# Patient Record
Sex: FEMALE | Race: WHITE | NOT HISPANIC OR LATINO | URBAN - METROPOLITAN AREA
[De-identification: names, ages, dates, MRNs, and addresses within clinical notes are randomized per-mention and may not be internally consistent; named-entity substitution may affect disease eponyms.]

---

## 2018-06-04 ENCOUNTER — ON CAMPUS - OUTPATIENT (OUTPATIENT)
Dept: URBAN - METROPOLITAN AREA HOSPITAL 2 | Facility: HOSPITAL | Age: 58
End: 2018-06-04

## 2018-06-04 ENCOUNTER — OFFICE (OUTPATIENT)
Dept: URBAN - METROPOLITAN AREA PATHOLOGY 4 | Facility: PATHOLOGY | Age: 58
End: 2018-06-04

## 2018-06-04 VITALS
HEART RATE: 100 BPM | DIASTOLIC BLOOD PRESSURE: 84 MMHG | DIASTOLIC BLOOD PRESSURE: 92 MMHG | DIASTOLIC BLOOD PRESSURE: 79 MMHG | TEMPERATURE: 97.8 F | SYSTOLIC BLOOD PRESSURE: 62 MMHG | OXYGEN SATURATION: 97 % | DIASTOLIC BLOOD PRESSURE: 105 MMHG | SYSTOLIC BLOOD PRESSURE: 135 MMHG | DIASTOLIC BLOOD PRESSURE: 110 MMHG | SYSTOLIC BLOOD PRESSURE: 134 MMHG | HEART RATE: 88 BPM | DIASTOLIC BLOOD PRESSURE: 72 MMHG | RESPIRATION RATE: 15 BRPM | OXYGEN SATURATION: 98 % | HEIGHT: 65 IN | DIASTOLIC BLOOD PRESSURE: 80 MMHG | SYSTOLIC BLOOD PRESSURE: 140 MMHG | HEART RATE: 90 BPM | SYSTOLIC BLOOD PRESSURE: 118 MMHG | DIASTOLIC BLOOD PRESSURE: 71 MMHG | SYSTOLIC BLOOD PRESSURE: 128 MMHG | HEART RATE: 92 BPM | RESPIRATION RATE: 17 BRPM | DIASTOLIC BLOOD PRESSURE: 67 MMHG | DIASTOLIC BLOOD PRESSURE: 73 MMHG | RESPIRATION RATE: 16 BRPM | HEART RATE: 89 BPM | HEART RATE: 102 BPM | DIASTOLIC BLOOD PRESSURE: 30 MMHG | WEIGHT: 205 LBS | SYSTOLIC BLOOD PRESSURE: 131 MMHG | SYSTOLIC BLOOD PRESSURE: 137 MMHG | SYSTOLIC BLOOD PRESSURE: 146 MMHG | DIASTOLIC BLOOD PRESSURE: 89 MMHG | OXYGEN SATURATION: 99 % | HEART RATE: 93 BPM | SYSTOLIC BLOOD PRESSURE: 148 MMHG | HEART RATE: 91 BPM

## 2018-06-04 DIAGNOSIS — K21.0 GASTRO-ESOPHAGEAL REFLUX DISEASE WITH ESOPHAGITIS: ICD-10-CM

## 2018-06-04 DIAGNOSIS — Z86.010 PERSONAL HISTORY OF COLONIC POLYPS: ICD-10-CM

## 2018-06-04 DIAGNOSIS — K44.9 DIAPHRAGMATIC HERNIA WITHOUT OBSTRUCTION OR GANGRENE: ICD-10-CM

## 2018-06-04 DIAGNOSIS — D12.3 BENIGN NEOPLASM OF TRANSVERSE COLON: ICD-10-CM

## 2018-06-04 DIAGNOSIS — K64.8 OTHER HEMORRHOIDS: ICD-10-CM

## 2018-06-04 DIAGNOSIS — D12.0 BENIGN NEOPLASM OF CECUM: ICD-10-CM

## 2018-06-04 DIAGNOSIS — K57.30 DIVERTICULOSIS OF LARGE INTESTINE WITHOUT PERFORATION OR ABS: ICD-10-CM

## 2018-06-04 DIAGNOSIS — K22.70 BARRETT'S ESOPHAGUS WITHOUT DYSPLASIA: ICD-10-CM

## 2018-06-04 DIAGNOSIS — K22.2 ESOPHAGEAL OBSTRUCTION: ICD-10-CM

## 2018-06-04 DIAGNOSIS — D12.5 BENIGN NEOPLASM OF SIGMOID COLON: ICD-10-CM

## 2018-06-04 DIAGNOSIS — K63.5 POLYP OF COLON: ICD-10-CM

## 2018-06-04 DIAGNOSIS — K63.3 ULCER OF INTESTINE: ICD-10-CM

## 2018-06-04 PROBLEM — K64.9 UNSPECIFIED HEMORRHOIDS: Status: ACTIVE | Noted: 2018-06-04

## 2018-06-04 LAB
GI HISTOLOGY: A. UNSPECIFIED: (no result)
GI HISTOLOGY: B. UNSPECIFIED: (no result)
GI HISTOLOGY: C. UNSPECIFIED: (no result)
GI HISTOLOGY: D. SELECT: (no result)
GI HISTOLOGY: E. UNSPECIFIED: (no result)
GI HISTOLOGY: PDF REPORT: (no result)

## 2018-06-04 PROCEDURE — 45380 COLONOSCOPY AND BIOPSY: CPT | Mod: 59 | Performed by: INTERNAL MEDICINE

## 2018-06-04 PROCEDURE — 43239 EGD BIOPSY SINGLE/MULTIPLE: CPT | Performed by: INTERNAL MEDICINE

## 2018-06-04 PROCEDURE — 88305 TISSUE EXAM BY PATHOLOGIST: CPT | Mod: 33 | Performed by: INTERNAL MEDICINE

## 2018-06-04 PROCEDURE — 45385 COLONOSCOPY W/LESION REMOVAL: CPT | Performed by: INTERNAL MEDICINE

## 2018-06-04 PROCEDURE — 43450 DILATE ESOPHAGUS 1/MULT PASS: CPT | Performed by: INTERNAL MEDICINE

## 2018-06-04 RX ORDER — OMEPRAZOLE 20 MG/1
CAPSULE, DELAYED RELEASE ORAL
Qty: 30 | Refills: 0 | Status: ACTIVE

## 2018-06-04 RX ADMIN — Medication 10 MG: at 13:05

## 2018-06-04 RX ADMIN — PROPOFOL: 10 INJECTION, EMULSION INTRAVENOUS at 12:50

## 2024-01-30 ENCOUNTER — OFFICE VISIT (OUTPATIENT)
Dept: PSYCHIATRY | Facility: CLINIC | Age: 64
End: 2024-01-30
Payer: COMMERCIAL

## 2024-01-30 DIAGNOSIS — F41.1 GENERALIZED ANXIETY DISORDER: ICD-10-CM

## 2024-01-30 DIAGNOSIS — F33.1 MAJOR DEPRESSIVE DISORDER, RECURRENT EPISODE, MODERATE: ICD-10-CM

## 2024-01-30 RX ORDER — HYDROXYZINE HYDROCHLORIDE 25 MG/1
25 TABLET, FILM COATED ORAL 2 TIMES DAILY PRN
Qty: 60 TABLET | Refills: 2 | Status: SHIPPED | OUTPATIENT
Start: 2024-01-30

## 2024-01-30 RX ORDER — SERTRALINE HYDROCHLORIDE 100 MG/1
100 TABLET, FILM COATED ORAL NIGHTLY
Qty: 30 TABLET | Refills: 2 | Status: SHIPPED | OUTPATIENT
Start: 2024-01-30 | End: 2025-01-29
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
## 2024-03-26 ENCOUNTER — OFFICE VISIT (OUTPATIENT)
Dept: PSYCHIATRY | Facility: CLINIC | Age: 64
End: 2024-03-26
Payer: COMMERCIAL

## 2024-03-26 DIAGNOSIS — F41.1 GENERALIZED ANXIETY DISORDER: ICD-10-CM

## 2024-03-26 DIAGNOSIS — F33.1 MAJOR DEPRESSIVE DISORDER, RECURRENT EPISODE, MODERATE: Primary | ICD-10-CM

## 2024-03-26 RX ORDER — SERTRALINE HYDROCHLORIDE 100 MG/1
100 TABLET, FILM COATED ORAL NIGHTLY
Qty: 90 TABLET | Refills: 1 | Status: SHIPPED | OUTPATIENT
Start: 2024-03-26 | End: 2025-03-26

## 2024-03-26 RX ORDER — HYDROXYZINE HYDROCHLORIDE 25 MG/1
25 TABLET, FILM COATED ORAL 2 TIMES DAILY PRN
Qty: 180 TABLET | Refills: 0 | Status: SHIPPED | OUTPATIENT
Start: 2024-03-26
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
## 2024-03-30 ENCOUNTER — HOSPITAL ENCOUNTER (OUTPATIENT)
Facility: HOSPITAL | Age: 64
Setting detail: OBSERVATION
Discharge: HOME | End: 2024-03-31
Attending: EMERGENCY MEDICINE | Admitting: FAMILY MEDICINE
Payer: COMMERCIAL

## 2024-03-30 ENCOUNTER — APPOINTMENT (OUTPATIENT)
Dept: MRI IMAGING | Facility: HOSPITAL | Age: 64
End: 2024-03-30
Payer: COMMERCIAL

## 2024-03-30 ENCOUNTER — APPOINTMENT (OUTPATIENT)
Dept: GENERAL RADIOLOGY | Facility: HOSPITAL | Age: 64
End: 2024-03-30
Payer: COMMERCIAL

## 2024-03-30 ENCOUNTER — APPOINTMENT (OUTPATIENT)
Dept: CT IMAGING | Facility: HOSPITAL | Age: 64
End: 2024-03-30
Payer: COMMERCIAL

## 2024-03-30 DIAGNOSIS — S05.02XA LEFT CORNEAL ABRASION, INITIAL ENCOUNTER: ICD-10-CM

## 2024-03-30 DIAGNOSIS — H53.462 HEMIANOPIA OF LEFT EYE: Primary | ICD-10-CM

## 2024-03-30 PROBLEM — H53.47 HEMIANOPSIA: Status: ACTIVE | Noted: 2024-03-30

## 2024-03-30 LAB
ALBUMIN SERPL-MCNC: 4.5 G/DL (ref 3.5–5.2)
ALBUMIN/GLOB SERPL: 1.9 G/DL
ALP SERPL-CCNC: 101 U/L (ref 39–117)
ALT SERPL W P-5'-P-CCNC: 27 U/L (ref 1–33)
ANION GAP SERPL CALCULATED.3IONS-SCNC: 13.3 MMOL/L (ref 5–15)
ANION GAP SERPL CALCULATED.3IONS-SCNC: 14 MMOL/L (ref 5–15)
APTT PPP: 27.7 SECONDS (ref 24–31)
AST SERPL-CCNC: 17 U/L (ref 1–32)
BASOPHILS # BLD AUTO: 0.02 10*3/MM3 (ref 0–0.2)
BASOPHILS # BLD AUTO: 0.04 10*3/MM3 (ref 0–0.2)
BASOPHILS NFR BLD AUTO: 0.3 % (ref 0–1.5)
BASOPHILS NFR BLD AUTO: 0.6 % (ref 0–1.5)
BILIRUB SERPL-MCNC: 0.6 MG/DL (ref 0–1.2)
BUN SERPL-MCNC: 11 MG/DL (ref 8–23)
BUN SERPL-MCNC: 12 MG/DL (ref 8–23)
BUN/CREAT SERPL: 14.3 (ref 7–25)
BUN/CREAT SERPL: 14.3 (ref 7–25)
CALCIUM SPEC-SCNC: 9.7 MG/DL (ref 8.6–10.5)
CALCIUM SPEC-SCNC: 9.8 MG/DL (ref 8.6–10.5)
CHLORIDE SERPL-SCNC: 95 MMOL/L (ref 98–107)
CHLORIDE SERPL-SCNC: 97 MMOL/L (ref 98–107)
CHOLEST SERPL-MCNC: 158 MG/DL (ref 0–200)
CO2 SERPL-SCNC: 23.7 MMOL/L (ref 22–29)
CO2 SERPL-SCNC: 24 MMOL/L (ref 22–29)
CREAT SERPL-MCNC: 0.77 MG/DL (ref 0.57–1)
CREAT SERPL-MCNC: 0.84 MG/DL (ref 0.57–1)
DEPRECATED RDW RBC AUTO: 38.5 FL (ref 37–54)
DEPRECATED RDW RBC AUTO: 40.2 FL (ref 37–54)
EGFRCR SERPLBLD CKD-EPI 2021: 78.2 ML/MIN/1.73
EGFRCR SERPLBLD CKD-EPI 2021: 86.8 ML/MIN/1.73
EOSINOPHIL # BLD AUTO: 0.13 10*3/MM3 (ref 0–0.4)
EOSINOPHIL # BLD AUTO: 0.13 10*3/MM3 (ref 0–0.4)
EOSINOPHIL NFR BLD AUTO: 1.7 % (ref 0.3–6.2)
EOSINOPHIL NFR BLD AUTO: 1.9 % (ref 0.3–6.2)
ERYTHROCYTE [DISTWIDTH] IN BLOOD BY AUTOMATED COUNT: 12.1 % (ref 12.3–15.4)
ERYTHROCYTE [DISTWIDTH] IN BLOOD BY AUTOMATED COUNT: 12.3 % (ref 12.3–15.4)
GLOBULIN UR ELPH-MCNC: 2.4 GM/DL
GLUCOSE BLDC GLUCOMTR-MCNC: 249 MG/DL (ref 70–105)
GLUCOSE BLDC GLUCOMTR-MCNC: 267 MG/DL (ref 70–130)
GLUCOSE SERPL-MCNC: 175 MG/DL (ref 65–99)
GLUCOSE SERPL-MCNC: 262 MG/DL (ref 65–99)
HBA1C MFR BLD: 10.1 % (ref 4.8–5.6)
HCT VFR BLD AUTO: 47.3 % (ref 34–46.6)
HCT VFR BLD AUTO: 49.1 % (ref 34–46.6)
HDLC SERPL-MCNC: 41 MG/DL (ref 40–60)
HGB BLD-MCNC: 15.5 G/DL (ref 12–15.9)
HGB BLD-MCNC: 15.8 G/DL (ref 12–15.9)
HOLD SPECIMEN: NORMAL
IMM GRANULOCYTES # BLD AUTO: 0.04 10*3/MM3 (ref 0–0.05)
IMM GRANULOCYTES # BLD AUTO: 0.04 10*3/MM3 (ref 0–0.05)
IMM GRANULOCYTES NFR BLD AUTO: 0.5 % (ref 0–0.5)
IMM GRANULOCYTES NFR BLD AUTO: 0.6 % (ref 0–0.5)
INR PPP: 1.3 (ref 0.8–1.2)
LDLC SERPL CALC-MCNC: 74 MG/DL (ref 0–100)
LDLC/HDLC SERPL: 1.55 {RATIO}
LYMPHOCYTES # BLD AUTO: 1.41 10*3/MM3 (ref 0.7–3.1)
LYMPHOCYTES # BLD AUTO: 2.42 10*3/MM3 (ref 0.7–3.1)
LYMPHOCYTES NFR BLD AUTO: 21 % (ref 19.6–45.3)
LYMPHOCYTES NFR BLD AUTO: 31.5 % (ref 19.6–45.3)
MAGNESIUM SERPL-MCNC: 1.8 MG/DL (ref 1.6–2.4)
MCH RBC QN AUTO: 28.1 PG (ref 26.6–33)
MCH RBC QN AUTO: 28.2 PG (ref 26.6–33)
MCHC RBC AUTO-ENTMCNC: 32.2 G/DL (ref 31.5–35.7)
MCHC RBC AUTO-ENTMCNC: 32.8 G/DL (ref 31.5–35.7)
MCV RBC AUTO: 86.2 FL (ref 79–97)
MCV RBC AUTO: 87.4 FL (ref 79–97)
MONOCYTES # BLD AUTO: 0.45 10*3/MM3 (ref 0.1–0.9)
MONOCYTES # BLD AUTO: 0.51 10*3/MM3 (ref 0.1–0.9)
MONOCYTES NFR BLD AUTO: 6.6 % (ref 5–12)
MONOCYTES NFR BLD AUTO: 6.7 % (ref 5–12)
NEUTROPHILS NFR BLD AUTO: 4.56 10*3/MM3 (ref 1.7–7)
NEUTROPHILS NFR BLD AUTO: 4.63 10*3/MM3 (ref 1.7–7)
NEUTROPHILS NFR BLD AUTO: 59.4 % (ref 42.7–76)
NEUTROPHILS NFR BLD AUTO: 69.2 % (ref 42.7–76)
NRBC BLD AUTO-RTO: 0 /100 WBC (ref 0–0.2)
PLATELET # BLD AUTO: 249 10*3/MM3 (ref 140–450)
PLATELET # BLD AUTO: 280 10*3/MM3 (ref 140–450)
PMV BLD AUTO: 9.4 FL (ref 6–12)
PMV BLD AUTO: 9.9 FL (ref 6–12)
POTASSIUM SERPL-SCNC: 4.1 MMOL/L (ref 3.5–5.2)
POTASSIUM SERPL-SCNC: 4.4 MMOL/L (ref 3.5–5.2)
PROT SERPL-MCNC: 6.9 G/DL (ref 6–8.5)
PROTHROMBIN TIME: 14.2 SECONDS
QT INTERVAL: 364 MS
QTC INTERVAL: 470 MS
RBC # BLD AUTO: 5.49 10*6/MM3 (ref 3.77–5.28)
RBC # BLD AUTO: 5.62 10*6/MM3 (ref 3.77–5.28)
SODIUM SERPL-SCNC: 132 MMOL/L (ref 136–145)
SODIUM SERPL-SCNC: 135 MMOL/L (ref 136–145)
TRIGL SERPL-MCNC: 267 MG/DL (ref 0–150)
TROPONIN T SERPL HS-MCNC: 8 NG/L
TSH SERPL DL<=0.05 MIU/L-ACNC: 1.02 UIU/ML (ref 0.27–4.2)
VLDLC SERPL-MCNC: 43 MG/DL (ref 5–40)
WBC NRBC COR # BLD AUTO: 6.7 10*3/MM3 (ref 3.4–10.8)
WBC NRBC COR # BLD AUTO: 7.68 10*3/MM3 (ref 3.4–10.8)
WHOLE BLOOD HOLD COAG: NORMAL
WHOLE BLOOD HOLD SPECIMEN: NORMAL

## 2024-03-30 PROCEDURE — 82948 REAGENT STRIP/BLOOD GLUCOSE: CPT

## 2024-03-30 PROCEDURE — 85730 THROMBOPLASTIN TIME PARTIAL: CPT | Performed by: EMERGENCY MEDICINE

## 2024-03-30 PROCEDURE — 70498 CT ANGIOGRAPHY NECK: CPT

## 2024-03-30 PROCEDURE — 70553 MRI BRAIN STEM W/O & W/DYE: CPT

## 2024-03-30 PROCEDURE — 99291 CRITICAL CARE FIRST HOUR: CPT

## 2024-03-30 PROCEDURE — 84443 ASSAY THYROID STIM HORMONE: CPT | Performed by: PSYCHIATRY & NEUROLOGY

## 2024-03-30 PROCEDURE — 70450 CT HEAD/BRAIN W/O DYE: CPT

## 2024-03-30 PROCEDURE — 0042T HC CT CEREBRAL PERFUSION W/WO CONTRAST: CPT

## 2024-03-30 PROCEDURE — 85025 COMPLETE CBC W/AUTO DIFF WBC: CPT

## 2024-03-30 PROCEDURE — 80061 LIPID PANEL: CPT | Performed by: PSYCHIATRY & NEUROLOGY

## 2024-03-30 PROCEDURE — 85610 PROTHROMBIN TIME: CPT | Performed by: EMERGENCY MEDICINE

## 2024-03-30 PROCEDURE — 99291 CRITICAL CARE FIRST HOUR: CPT | Performed by: EMERGENCY MEDICINE

## 2024-03-30 PROCEDURE — 99204 OFFICE O/P NEW MOD 45 MIN: CPT | Performed by: STUDENT IN AN ORGANIZED HEALTH CARE EDUCATION/TRAINING PROGRAM

## 2024-03-30 PROCEDURE — 84484 ASSAY OF TROPONIN QUANT: CPT | Performed by: EMERGENCY MEDICINE

## 2024-03-30 PROCEDURE — 25510000001 IOPAMIDOL PER 1 ML: Performed by: EMERGENCY MEDICINE

## 2024-03-30 PROCEDURE — 93005 ELECTROCARDIOGRAM TRACING: CPT | Performed by: EMERGENCY MEDICINE

## 2024-03-30 PROCEDURE — 63710000001 INSULIN LISPRO (HUMAN) PER 5 UNITS: Performed by: FAMILY MEDICINE

## 2024-03-30 PROCEDURE — 83036 HEMOGLOBIN GLYCOSYLATED A1C: CPT | Performed by: PSYCHIATRY & NEUROLOGY

## 2024-03-30 PROCEDURE — 80053 COMPREHEN METABOLIC PANEL: CPT | Performed by: EMERGENCY MEDICINE

## 2024-03-30 PROCEDURE — 93010 ELECTROCARDIOGRAM REPORT: CPT | Performed by: EMERGENCY MEDICINE

## 2024-03-30 PROCEDURE — 82607 VITAMIN B-12: CPT | Performed by: PSYCHIATRY & NEUROLOGY

## 2024-03-30 PROCEDURE — G0378 HOSPITAL OBSERVATION PER HR: HCPCS

## 2024-03-30 PROCEDURE — 71045 X-RAY EXAM CHEST 1 VIEW: CPT

## 2024-03-30 PROCEDURE — 25010000002 GADOTERIDOL PER 1 ML: Performed by: FAMILY MEDICINE

## 2024-03-30 PROCEDURE — A9579 GAD-BASE MR CONTRAST NOS,1ML: HCPCS | Performed by: FAMILY MEDICINE

## 2024-03-30 PROCEDURE — 83735 ASSAY OF MAGNESIUM: CPT | Performed by: FAMILY MEDICINE

## 2024-03-30 PROCEDURE — 85025 COMPLETE CBC W/AUTO DIFF WBC: CPT | Performed by: FAMILY MEDICINE

## 2024-03-30 PROCEDURE — 70496 CT ANGIOGRAPHY HEAD: CPT

## 2024-03-30 RX ORDER — DEXTROSE MONOHYDRATE 25 G/50ML
25 INJECTION, SOLUTION INTRAVENOUS
Status: DISCONTINUED | OUTPATIENT
Start: 2024-03-30 | End: 2024-03-31 | Stop reason: HOSPADM

## 2024-03-30 RX ORDER — METOPROLOL TARTRATE 1 MG/ML
5 INJECTION, SOLUTION INTRAVENOUS EVERY 4 HOURS PRN
Status: DISCONTINUED | OUTPATIENT
Start: 2024-03-30 | End: 2024-03-31 | Stop reason: HOSPADM

## 2024-03-30 RX ORDER — SODIUM CHLORIDE 0.9 % (FLUSH) 0.9 %
10 SYRINGE (ML) INJECTION AS NEEDED
Status: DISCONTINUED | OUTPATIENT
Start: 2024-03-30 | End: 2024-03-31 | Stop reason: HOSPADM

## 2024-03-30 RX ORDER — POLYETHYLENE GLYCOL 3350 17 G/17G
17 POWDER, FOR SOLUTION ORAL DAILY PRN
Status: DISCONTINUED | OUTPATIENT
Start: 2024-03-30 | End: 2024-03-31 | Stop reason: HOSPADM

## 2024-03-30 RX ORDER — INSULIN LISPRO 100 [IU]/ML
2-7 INJECTION, SOLUTION INTRAVENOUS; SUBCUTANEOUS
Status: DISCONTINUED | OUTPATIENT
Start: 2024-03-30 | End: 2024-03-31 | Stop reason: HOSPADM

## 2024-03-30 RX ORDER — SODIUM CHLORIDE 9 MG/ML
40 INJECTION, SOLUTION INTRAVENOUS AS NEEDED
Status: DISCONTINUED | OUTPATIENT
Start: 2024-03-30 | End: 2024-03-31 | Stop reason: HOSPADM

## 2024-03-30 RX ORDER — NICOTINE POLACRILEX 4 MG
15 LOZENGE BUCCAL
Status: DISCONTINUED | OUTPATIENT
Start: 2024-03-30 | End: 2024-03-31 | Stop reason: HOSPADM

## 2024-03-30 RX ORDER — BISACODYL 10 MG
10 SUPPOSITORY, RECTAL RECTAL DAILY PRN
Status: DISCONTINUED | OUTPATIENT
Start: 2024-03-30 | End: 2024-03-31 | Stop reason: HOSPADM

## 2024-03-30 RX ORDER — ONDANSETRON 2 MG/ML
4 INJECTION INTRAMUSCULAR; INTRAVENOUS EVERY 6 HOURS PRN
Status: DISCONTINUED | OUTPATIENT
Start: 2024-03-30 | End: 2024-03-31 | Stop reason: HOSPADM

## 2024-03-30 RX ORDER — BISACODYL 5 MG/1
5 TABLET, DELAYED RELEASE ORAL DAILY PRN
Status: DISCONTINUED | OUTPATIENT
Start: 2024-03-30 | End: 2024-03-31 | Stop reason: HOSPADM

## 2024-03-30 RX ORDER — NITROGLYCERIN 0.4 MG/1
0.4 TABLET SUBLINGUAL
Status: DISCONTINUED | OUTPATIENT
Start: 2024-03-30 | End: 2024-03-31 | Stop reason: HOSPADM

## 2024-03-30 RX ORDER — ERYTHROMYCIN 5 MG/G
1 OINTMENT OPHTHALMIC EVERY 6 HOURS SCHEDULED
Status: DISCONTINUED | OUTPATIENT
Start: 2024-03-30 | End: 2024-03-31 | Stop reason: HOSPADM

## 2024-03-30 RX ORDER — ONDANSETRON 4 MG/1
4 TABLET, ORALLY DISINTEGRATING ORAL EVERY 6 HOURS PRN
Status: DISCONTINUED | OUTPATIENT
Start: 2024-03-30 | End: 2024-03-31 | Stop reason: HOSPADM

## 2024-03-30 RX ORDER — IBUPROFEN 600 MG/1
1 TABLET ORAL
Status: DISCONTINUED | OUTPATIENT
Start: 2024-03-30 | End: 2024-03-31 | Stop reason: HOSPADM

## 2024-03-30 RX ORDER — SODIUM CHLORIDE 0.9 % (FLUSH) 0.9 %
10 SYRINGE (ML) INJECTION EVERY 12 HOURS SCHEDULED
Status: DISCONTINUED | OUTPATIENT
Start: 2024-03-30 | End: 2024-03-31 | Stop reason: HOSPADM

## 2024-03-30 RX ORDER — AMOXICILLIN 250 MG
2 CAPSULE ORAL 2 TIMES DAILY PRN
Status: DISCONTINUED | OUTPATIENT
Start: 2024-03-30 | End: 2024-03-31 | Stop reason: HOSPADM

## 2024-03-30 RX ADMIN — INSULIN LISPRO 3 UNITS: 100 INJECTION, SOLUTION INTRAVENOUS; SUBCUTANEOUS at 21:27

## 2024-03-30 RX ADMIN — FLUORESCEIN SODIUM 1 STRIP: 1 STRIP OPHTHALMIC at 11:40

## 2024-03-30 RX ADMIN — IOPAMIDOL 50 ML: 755 INJECTION, SOLUTION INTRAVENOUS at 12:03

## 2024-03-30 RX ADMIN — IOPAMIDOL 100 ML: 755 INJECTION, SOLUTION INTRAVENOUS at 12:06

## 2024-03-30 RX ADMIN — GADOTERIDOL 18 ML: 279.3 INJECTION, SOLUTION INTRAVENOUS at 17:40

## 2024-03-30 RX ADMIN — Medication 10 ML: at 21:28

## 2024-03-30 NOTE — PLAN OF CARE
Problem: Adult Inpatient Plan of Care  Goal: Plan of Care Review  Outcome: Ongoing, Progressing  Goal: Patient-Specific Goal (Individualized)  Outcome: Ongoing, Progressing  Goal: Absence of Hospital-Acquired Illness or Injury  Outcome: Ongoing, Progressing  Goal: Optimal Comfort and Wellbeing  Outcome: Ongoing, Progressing  Goal: Readiness for Transition of Care  Outcome: Ongoing, Progressing  Intervention: Mutually Develop Transition Plan  Recent Flowsheet Documentation  Taken 3/30/2024 2619 by Parvin Bangura RN  Equipment Currently Used at Home: none  Transportation Anticipated: family or friend will provide  Patient/Family Anticipated Services at Transition: none  Patient/Family Anticipates Transition to: home with family   Goal Outcome Evaluation:         Patient arrived to our unit from the [facility] and a complete head to toe assessment was completed as well as an admission assessment. A two nurse skin check was performed and there were not any pressure injuries noted. The patient is awaiting a MRI. The patient did not have any complaints and remains on room air. Will continue to monitor.

## 2024-03-30 NOTE — PLAN OF CARE
Problem: Adult Inpatient Plan of Care  Goal: Plan of Care Review  Outcome: Ongoing, Progressing  Goal: Patient-Specific Goal (Individualized)  Outcome: Ongoing, Progressing  Goal: Absence of Hospital-Acquired Illness or Injury  Outcome: Ongoing, Progressing  Goal: Optimal Comfort and Wellbeing  Outcome: Ongoing, Progressing  Goal: Readiness for Transition of Care  Outcome: Ongoing, Progressing     Problem: Diabetes Comorbidity  Goal: Blood Glucose Level Within Targeted Range  Outcome: Ongoing, Progressing     Problem: Hypertension Comorbidity  Goal: Blood Pressure in Desired Range  Outcome: Ongoing, Progressing     Problem: Adjustment to Illness (Stroke, Ischemic/Transient Ischemic Attack)  Goal: Optimal Coping  Outcome: Ongoing, Progressing     Problem: Bowel Elimination Impaired (Stroke, Ischemic/Transient Ischemic Attack)  Goal: Effective Bowel Elimination  Outcome: Ongoing, Progressing     Problem: Cerebral Tissue Perfusion (Stroke, Ischemic/Transient Ischemic Attack)  Goal: Optimal Cerebral Tissue Perfusion  Outcome: Ongoing, Progressing     Problem: Cognitive Impairment (Stroke, Ischemic/Transient Ischemic Attack)  Goal: Optimal Cognitive Function  Outcome: Ongoing, Progressing     Problem: Communication Impairment (Stroke, Ischemic/Transient Ischemic Attack)  Goal: Improved Communication Skills  Outcome: Ongoing, Progressing     Problem: Functional Ability Impaired (Stroke, Ischemic/Transient Ischemic Attack)  Goal: Optimal Functional Ability  Outcome: Ongoing, Progressing     Problem: Respiratory Compromise (Stroke, Ischemic/Transient Ischemic Attack)  Goal: Effective Oxygenation and Ventilation  Outcome: Ongoing, Progressing     Problem: Sensorimotor Impairment (Stroke, Ischemic/Transient Ischemic Attack)  Goal: Improved Sensorimotor Function  Outcome: Ongoing, Progressing     Problem: Swallowing Impairment (Stroke, Ischemic/Transient Ischemic Attack)  Goal: Optimal Eating and Swallowing without  Aspiration  Outcome: Ongoing, Progressing     Problem: Urinary Elimination Impaired (Stroke, Ischemic/Transient Ischemic Attack)  Goal: Effective Urinary Elimination  Outcome: Ongoing, Progressing   Goal Outcome Evaluation:

## 2024-03-30 NOTE — H&P
[facility] Medicine Services  History & Physical    Patient Name: Rita Hussein  : 1960  MRN: [id number]  Primary Care Physician:  Monserrat Reddy APRN  Date of admission: 3/30/2024  Date and Time of Service: 3/30/2024 at 1639    Subjective      Chief Complaint: vision change    History of Present Illness: Rita Hussein is a 63 y.o. female with a CMH of hypertension, hyperlipidemia, diabetes, CVA presenting to the hospital with vision changes on her left side.  Patient mostly describes it as a blurry vision and was also having pain in the left eye as well.  Patient did not have any focal weakness or any numbness tingling or any facial drooping.    Review of Systems   Respiratory:  Negative for shortness of breath.        Personal History     Past Medical History:   Diagnosis Date    Diabetes        History reviewed. No pertinent surgical history.    Family History: family history is not on file. Otherwise pertinent FHx was reviewed and not pertinent to current issue.    Social History:  reports that she has been smoking cigarettes. She has never used smokeless tobacco. She reports that she does not drink alcohol and does not use drugs.    Home Medications:  Prior to Admission Medications       Prescriptions Last Dose Informant Patient Reported? Taking?    Accu-Chek Softclix Lancets lancets   Yes No    See Admin Instructions.    atorvastatin (LIPITOR) 40 MG tablet   Yes No    TAKE ONE (1) TABLET BY MOUTH EVERY EVENING    clopidogrel (PLAVIX) 75 MG tablet   Yes No    TAKE ONE (1) TABLET BY MOUTH EVERY DAY    hydrOXYzine (ATARAX) 25 MG tablet   No No    Take 1 tablet by mouth 2 (Two) Times a Day As Needed for Anxiety.    Jardiance 25 MG tablet tablet   Yes No    TAKE ONE (1) TABLET BY MOUTH EVERY DAY    lisinopril (PRINIVIL,ZESTRIL) 10 MG tablet   Yes No    TAKE ONE (1) TABLET BY MOUTH EVERY DAY    loratadine (CLARITIN) 10 MG tablet   Yes No    Take 1 tablet by mouth Daily.    metFORMIN ER  (GLUCOPHAGE-XR) 500 MG 24 hr tablet   Yes No    TAKE TWO (2) TABLETS BY MOUTH EVERY DAY WITH breakfast    sertraline (Zoloft) 100 MG tablet   No No    Take 1 tablet by mouth Every Night.              Allergies:  No Known Allergies    Objective      Vitals:   Temp:  [98.5 °F (36.9 °C)] 98.5 °F (36.9 °C)  Heart Rate:  [] 99  Resp:  [16-19] 19  BP: (129-154)/(66-89) 134/66  Body mass index is 33.78 kg/m².  Physical Exam  Vitals and nursing note reviewed.   Constitutional:       General: She is not in acute distress.     Appearance: She is well-developed. She is not diaphoretic.   HENT:      Head: Normocephalic and atraumatic.   Cardiovascular:      Rate and Rhythm: Normal rate.   Pulmonary:      Effort: Pulmonary effort is normal. No respiratory distress.      Breath sounds: No wheezing.   Abdominal:      General: There is no distension.      Palpations: Abdomen is soft.   Musculoskeletal:         General: Normal range of motion.   Skin:     General: Skin is warm and dry.   Neurological:      General: No focal deficit present.      Mental Status: She is alert.      Cranial Nerves: No cranial nerve deficit.   Psychiatric:         Behavior: Behavior normal.         Thought Content: Thought content normal.         Judgment: Judgment normal.         Diagnostic Data:  Lab Results (last 24 hours)       Procedure Component Value Units Date/Time    [place] Draw [[id number]] Collected: 03/30/24 1047    Specimen: Blood Updated: 03/30/24 1216    Narrative:      The following orders were created for panel order [place] Draw.  Procedure                               Abnormality         Status                     ---------                               -----------         ------                     Green Top (Gel)[[id number]]                                                             Lavender Top[[id number]]                                     Final result               Gold Top - SST[[id number]]                                                               Light Blue Top[[id number]]                                   Final result               Green Top (Gel)[[id number]]                                  Final result                 Please view results for these tests on the individual orders.    Light Blue Top [[id number]] Collected: 03/30/24 1114    Specimen: Blood Updated: 03/30/24 1216     Extra Tube Hold for add-ons.     Comment: Auto resulted       Single High Sensitivity Troponin T [[id number]]  (Normal) Collected: 03/30/24 1047    Specimen: Blood Updated: 03/30/24 1201     HS Troponin T 8 ng/L     Narrative:      High Sensitive Troponin T Reference Range:  <14.0 ng/L- Negative Female for AMI  <22.0 ng/L- Negative Male for AMI  >=14 - Abnormal Female indicating possible myocardial injury.  >=22 - Abnormal Male indicating possible myocardial injury.   Clinicians would have to utilize clinical acumen, EKG, Troponin, and serial changes to determine if it is an Acute Myocardial Infarction or myocardial injury due to an underlying chronic condition.         Lavender Top [[id number]] Collected: 03/30/24 1047    Specimen: Blood Updated: 03/30/24 1200     Extra Tube hold for add-on     Comment: Auto resulted       Green Top (Gel) [[id number]] Collected: 03/30/24 1047    Specimen: Blood Updated: 03/30/24 1200     Extra Tube Hold for add-ons.     Comment: Auto resulted.       POC Protime / INR [[id number]]  (Abnormal) Collected: 03/30/24 1152    Specimen: Blood Updated: 03/30/24 1159     Protime 14.2 seconds      INR 1.3    aPTT [[id number]] Collected: 03/30/24 1114    Specimen: Blood Updated: 03/30/24 1153    Comprehensive Metabolic Panel [[id number]]  (Abnormal) Collected: 03/30/24 1047    Specimen: Blood Updated: 03/30/24 1121     Glucose 262 mg/dL      BUN 12 mg/dL      Creatinine 0.84 mg/dL      Sodium 132 mmol/L      Potassium 4.1 mmol/L      Chloride 95 mmol/L      CO2 23.7 mmol/L      Calcium 9.7 mg/dL      Total Protein 6.9 g/dL      Albumin 4.5  g/dL      ALT (SGPT) 27 U/L      AST (SGOT) 17 U/L      Alkaline Phosphatase 101 U/L      Total Bilirubin 0.6 mg/dL      Globulin 2.4 gm/dL      A/G Ratio 1.9 g/dL      BUN/Creatinine Ratio 14.3     Anion Gap 13.3 mmol/L      eGFR 78.2 mL/min/1.73     Narrative:      GFR Normal >60  Chronic Kidney Disease <60  Kidney Failure <15      CBC & Differential [[id number]]  (Abnormal) Collected: 03/30/24 1047    Specimen: Blood Updated: 03/30/24 1054    Narrative:      The following orders were created for panel order CBC & Differential.  Procedure                               Abnormality         Status                     ---------                               -----------         ------                     CBC Auto Differential[[id number]]        Abnormal            Final result                 Please view results for these tests on the individual orders.    CBC Auto Differential [[id number]]  (Abnormal) Collected: 03/30/24 1047    Specimen: Blood Updated: 03/30/24 1054     WBC 7.68 10*3/mm3      RBC 5.62 10*6/mm3      Hemoglobin 15.8 g/dL      Hematocrit 49.1 %      MCV 87.4 fL      MCH 28.1 pg      MCHC 32.2 g/dL      RDW 12.3 %      RDW-SD 40.2 fl      MPV 9.4 fL      Platelets 249 10*3/mm3      Neutrophil % 59.4 %      Lymphocyte % 31.5 %      Monocyte % 6.6 %      Eosinophil % 1.7 %      Basophil % 0.3 %      Immature Grans % 0.5 %      Neutrophils, Absolute 4.56 10*3/mm3      Lymphocytes, Absolute 2.42 10*3/mm3      Monocytes, Absolute 0.51 10*3/mm3      Eosinophils, Absolute 0.13 10*3/mm3      Basophils, Absolute 0.02 10*3/mm3      Immature Grans, Absolute 0.04 10*3/mm3     POC Glucose Once [[id number]]  (Abnormal) Collected: 03/30/24 1049    Specimen: Blood Updated: 03/30/24 1051     Glucose 267 mg/dL      Comment: Serial Number: [id number]:  [id number]                Imaging Results (Last 24 Hours)       Procedure Component Value Units Date/Time    CT Angiogram Head w AI Analysis of LVO [[id number]] Collected:  03/30/24 1211     Updated: 03/30/24 1222    Narrative:      CT ANGIOGRAM HEAD W AI ANALYSIS OF LVO, CT ANGIOGRAM NECK    Date of Exam: 3/30/2024 11:55 AM EDT    Indication: Neuro deficit, acute stroke suspected.    Comparison: None available.    Technique: CTA of the head was performed after the uneventful intravenous administration of iodinated contrast. Reconstructed coronal and sagittal images were also obtained. In addition, a 3-D volume rendered image was created for interpretation.   Automated exposure control and iterative reconstruction methods were used.    FINDINGS:    Vascular Findings:    The right common carotid, internal carotid, middle cerebral, anterior cerebral, vertebral, and posterior cerebral arteries are patent without abrupt cut off or aneurysmal dilation.    The left common carotid, internal carotid, middle cerebral, anterior cerebral, vertebral, and posterior cerebral arteries are patent without abrupt cut off or aneurysmal dilation.    Limited visualization of the bilateral vertebral artery origins due to motion artifact.    Basilar artery appears patent and appears unremarkable.    Non-vascular Findings:    For description of nonvascular intracranial findings, please refer to the noncontrast head CT performed the same date.    No acute abnormality is identified within the visualized soft tissue or bony structures of the neck.    The visualized lung apices are clear.      Impression:      1.No acute abnormality identified within the large arteries of the head or neck.  2.No significant stenosis of the bilateral internal carotid arteries.      Electronically Signed: Jason Renteria MD    3/30/2024 12:20 PM EDT    Workstation ID: [id number]    CT Angiogram Neck [[id number]] Collected: 03/30/24 1211     Updated: 03/30/24 1222    Narrative:      CT ANGIOGRAM HEAD W AI ANALYSIS OF LVO, CT ANGIOGRAM NECK    Date of Exam: 3/30/2024 11:55 AM EDT    Indication: Neuro deficit, acute stroke  suspected.    Comparison: None available.    Technique: CTA of the head was performed after the uneventful intravenous administration of iodinated contrast. Reconstructed coronal and sagittal images were also obtained. In addition, a 3-D volume rendered image was created for interpretation.   Automated exposure control and iterative reconstruction methods were used.    FINDINGS:    Vascular Findings:    The right common carotid, internal carotid, middle cerebral, anterior cerebral, vertebral, and posterior cerebral arteries are patent without abrupt cut off or aneurysmal dilation.    The left common carotid, internal carotid, middle cerebral, anterior cerebral, vertebral, and posterior cerebral arteries are patent without abrupt cut off or aneurysmal dilation.    Limited visualization of the bilateral vertebral artery origins due to motion artifact.    Basilar artery appears patent and appears unremarkable.    Non-vascular Findings:    For description of nonvascular intracranial findings, please refer to the noncontrast head CT performed the same date.    No acute abnormality is identified within the visualized soft tissue or bony structures of the neck.    The visualized lung apices are clear.      Impression:      1.No acute abnormality identified within the large arteries of the head or neck.  2.No significant stenosis of the bilateral internal carotid arteries.      Electronically Signed: Jason Renteria MD    3/30/2024 12:20 PM EDT    Workstation ID: [id number]    CT CEREBRAL PERFUSION WITH & WITHOUT CONTRAST [[id number]] Collected: 03/30/24 1206     Updated: 03/30/24 1209    Narrative:      CT CEREBRAL PERFUSION W WO CONTRAST    Date of Exam: 3/30/2024 11:55 AM EDT    Indication: Neuro deficit, acute, stroke suspected.     Comparison: CT head 3/30/2024    Technique: Axial CT images of the brain were obtained prior to and after the administration of iodinated contrast. CT Perfusion protocol was utilized. Automated  post processing was performed by RAPID software and submitted to PACS for interpretation.   Automated exposure control and iterative reconstruction was utilized.      Findings:            Cerebral blood flow, blood volume and mean transit time maps are symmetric without large perfusion defect.    CBF<30% volume: 0 mL  Tmax>6sec volume: 0 mL  Mismatch volume: 0 mL  Mismatch ratio: None              Impression:        1. No CT perfusion evidence of acute cerebral ischemia or core infarct.            Electronically Signed: Steve Leon MD    3/30/2024 12:07 PM EDT    Workstation ID: [id number]    CT Head Without Contrast Stroke Protocol [[id number]] Collected: 03/30/24 1151     Updated: 03/30/24 1158    Narrative:      CT HEAD WO CONTRAST STROKE PROTOCOL    Date of Exam: 3/30/2024 11:43 AM EDT    Indication: Neuro deficit, acute, stroke suspected  Neuro deficit, acute stroke suspected.    Comparison: None available.    Technique: Axial CT images were obtained of the head without contrast administration.  Reconstructed coronal and sagittal images were also obtained. Automated exposure control and iterative construction methods were used.    Scan Time: 3/30/2024 at 11:48 a.m.  Results discussed with Dr. Garcia via telephone on 3/30/2024 11:55 AM EDT.      FINDINGS:    Foci of periventricular and subcortical white matter hypoattenuation are consistent with chronic microvascular ischemic change. No significant mass effect, midline shift, intracranial hemorrhage, or hydrocephalus is identified. No extra-axial fluid   collection is identified.   The calvarium and overlying soft tissues are unremarkable. The paranasal sinuses and bilateral mastoid air cells are adequately aerated. The visualized bony orbits, globes, and retrobulbar soft tissues are unremarkable.      Impression:      1.No acute intracranial abnormality is identified.  2.Findings compatible with chronic microvascular ischemic change.        Electronically  Signed: Jason Renteria MD    3/30/2024 11:56 AM EDT    Workstation ID: [id number]    XR Chest 1 View [[id number]] Collected: 03/30/24 1105     Updated: 03/30/24 1108    Narrative:      XR CHEST 1 VW    Date of Exam: 3/30/2024 10:45 AM EDT    Indication: Stroke Protocol (Onset > 12 hrs)    Comparison: None available.    Findings:  Loop recorder projects over the left thorax. The lungs appear adequately aerated without consolidation or mass. No pleural effusion or pneumothorax is identified. The cardiomediastinal silhouette and pulmonary vasculature appear within normal limits. No   acute or suspicious osseous lesion is identified. Chronic remodeling of the right shoulder.      Impression:      Impression:  1.No acute radiographic abnormality is identified.      Electronically Signed: Jason Renteria MD    3/30/2024 11:06 AM EDT    Workstation ID: [id number]              Assessment & Plan        This is a 63 y.o. female with:    Active and Resolved Problems  Active Hospital Problems    Diagnosis  POA    **Hemianopsia [H53.47]  Yes      Resolved Hospital Problems   No resolved problems to display.       Left eye blurry vision-possible CVA, neurology has been consulted, continue to monitor.    Type 2 diabetes-continue with insulin protocol and glucose monitoring    Hypertension-continue monitoring blood pressure    DVT prophylaxis-SCD          The patient desires to be as follows:    CODE STATUS:    Code Status (Patient has no pulse and is not breathing): CPR (Attempt to Resuscitate)  Medical Interventions (Patient has pulse or is breathing): Full Support      Admission Status:  I believe this patient meets inpatient status.    Expected Length of Stay: 1-2    PDMP and Medication Dispenses via Sidebar reviewed and consistent with patient reported medications.    I discussed the patient's findings and my recommendations with patient.      Signature:     This document has been electronically signed by Ion Woody MD on  March 30, 2024 16:39 EDT   [facility] Hospitalist Team

## 2024-03-30 NOTE — ED NOTES
"Pt reports left eye pain. Pt states \"It feels like someone is poking me with a needle in my eye\" Pt reports upon waking this morning around [id number] she noticed pain to her left eye. She reports as she was waking up she noticed her vision in her left eye was blurry. Her Last known normal was 03- at 2330.  Upon exam she reported left eye pain increases with movement.   "
Dr. Guerra returned page for Neurology    
Neurology Paged   
Stroke Neurology called   
Stroke neuro provider evaluated pt via Zoom call. Reported to pt that labwork and CT scans were all negative. Informed pt that he would still like to admit pt for further workup including possible eye inflammation and eye evaluation of nerves. Pt was informed she may need an MRI.  
Visual acuity   Right 20/20  Left 20/200  Both 20/15  
Labor at Term/Rupture of Membranes

## 2024-03-30 NOTE — FSED PROVIDER NOTE
Subjective   History of Present Illness  63-year-old female presenting to the emergency department with chief of pain in the left.  Pain associated with visual changes.  Pain started this morning.  Patient woke up with the pain.  Pain is lasted for approximately 2 to 3 hours.  Patient had past history of CVA with left thigh and left hand involvement.        Review of Systems   Constitutional: Negative.    Eyes:  Positive for photophobia, pain and visual disturbance.   Neurological: Negative.  Negative for weakness.   Hematological: Negative.    Psychiatric/Behavioral: Negative.     All other systems reviewed and are negative.      Past Medical History:   Diagnosis Date    Diabetes        No Known Allergies    History reviewed. No pertinent surgical history.    History reviewed. No pertinent family history.    Social History     Socioeconomic History    Marital status:    Tobacco Use    Smoking status: Every Day     Types: Cigarettes    Smokeless tobacco: Never   Vaping Use    Vaping status: Never Used   Substance and Sexual Activity    Alcohol use: Never    Drug use: Never    Sexual activity: Defer           Objective   Physical Exam  Vitals and nursing note reviewed.   Constitutional:       Appearance: She is obese.   HENT:      Head: Normocephalic and atraumatic.   Eyes:      General: Lids are normal. Lids are everted, no foreign bodies appreciated.         Right eye: No foreign body or discharge.         Left eye: No foreign body or discharge.      Extraocular Movements: Extraocular movements intact.      Conjunctiva/sclera: Conjunctivae normal.      Pupils:      Right eye: No corneal abrasion or fluorescein uptake.      Left eye: Corneal abrasion and fluorescein uptake present.      Slit lamp exam:     Right eye: Anterior chamber quiet.      Left eye: Anterior chamber quiet.      Visual Fields:      Right eye: CF in the upper temporal quadrant. CF in the upper nasal quadrant. CF in the lower temporal  quadrant. CF in the lower nasal quadrant.        Comments: Fluorosence uptake -left conrena   Cardiovascular:      Rate and Rhythm: Normal rate and regular rhythm.   Musculoskeletal:      Cervical back: Normal range of motion and neck supple.   Neurological:      General: No focal deficit present.      Mental Status: She is alert and oriented to person, place, and time.      GCS: GCS eye subscore is 4. GCS verbal subscore is 5. GCS motor subscore is 6.      Cranial Nerves: No cranial nerve deficit.      Sensory: Sensation is intact. No sensory deficit.      Motor: Motor function is intact. No weakness.      Coordination: Romberg sign negative. Finger-Nose-Finger Test normal. Rapid alternating movements normal.      Gait: Gait normal.      Deep Tendon Reflexes:      Reflex Scores:       Patellar reflexes are 1+ on the right side and 1+ on the left side.     Comments: Left eye - left visual field deficit.   Right eye- normal visual field , no deficit.   Psychiatric:         Mood and Affect: Mood normal.         Behavior: Behavior normal.         ECG 12 Lead      Date/Time: 3/30/2024 12:53 PM    Performed by: Alireza Garcia MD  Authorized by: Alireza Garcia MD  Interpreted by ED physician  Rhythm: sinus rhythm  BPM: 100  Conduction: non-specific intraventricular conduction delay  ST Segments: ST segments normal  Clinical impression: non-specific ECG    Critical Care    Performed by: Ailreza Garcia MD  Authorized by: Alireza Garcia MD    Critical care provider statement:     Critical care time (minutes):  70    Critical care end time:  3/30/2024 12:55 PM    Critical care time was exclusive of:  Separately billable procedures and treating other patients    Critical care was necessary to treat or prevent imminent or life-threatening deterioration of the following conditions:  CNS failure or compromise    Critical care was time spent personally by me on the following activities:  Ordering and performing  treatments and interventions, ordering and review of laboratory studies, ordering and review of radiographic studies, pulse oximetry, re-evaluation of patient's condition, review of old charts, examination of patient, evaluation of patient's response to treatment, discussions with consultants and discussions with primary provider    I assumed direction of critical care for this patient from another provider in my specialty: yes      Care discussed with: admitting provider               ED Course  ED Course as of 03/30/24 1255   Sat Mar 30, 2024   1235 CT Angiogram Head w AI Analysis of LVO [RW]   1236 XR Chest 1 View [RW]   1236 ECG 12 Lead ED Triage Standing Order; Acute Stroke (Onset <24 hrs) [RW]      ED Course User Index  [RW] Alireza Garcia MD                          Total (NIH Stroke Scale): 1                Medical Decision Making  Problems Addressed:  Hemianopia of left eye: complicated acute illness or injury  Left corneal abrasion, initial encounter: complicated acute illness or injury    Amount and/or Complexity of Data Reviewed  Labs: ordered.  Radiology: ordered. Decision-making details documented in ED Course.  ECG/medicine tests: ordered. Decision-making details documented in ED Course.    Risk  Prescription drug management.  Decision regarding hospitalization.        Final diagnoses:   Hemianopia of left eye   Left corneal abrasion, initial encounter       ED Disposition  ED Disposition       ED Disposition   Decision to Admit    Condition   --    Comment   Level of Care: Telemetry [5]   Diagnosis: Hemianopsia [690048]   Admitting Physician: BRENDA AUGUSTE [[id number]]   Attending Physician: BRENDA AUGUSTE [[id number]]                 No follow-up provider specified.       Medication List      No changes were made to your prescriptions during this visit.

## 2024-03-30 NOTE — CONSULTS
[facility]   Teleneurology Note    Patient Name: Rita Hussein  : 1960  MRN: [id number]  Primary Care Physician: Monserrat Reddy APRN  Referring Site: [place]  Location of Neurologist: [place]    Subjective   Teleneurology Initial Data     Arrival Date Telestroke Site: 24 Arrival Time Telestroke Site: 1038   Neurologist Evaluation Date: 24 Neurologist Evaluation Time: 1221   Date Last Known Well: 24 Time Last Known Well: 2300     History     Chief Complaint: vision problem  HPI: woke up today, left eye hurts, vision is blurry. no other focal weakness. if she covers her left eye her vision is okay, but if she closes her right eye, she feels looking into a tissue paper. NO headache. no piror episodes. She has pain in the middle of her eye.    Stroke Risk Factors/ Pertinent Data     Stroke risk factors: none  Anticoagulants prior to arrival: not applicable  Antiplatelets prior to arrival: not applicable, clopidogrel (Plavix), aspirin  Statins prior to arrival: not applicable     Scoring Scales     Modified Creighton Scale  Pre-Stroke Modified Creighton Scale: 0 - No Symptoms at all.  Intracerebral Hemmorhage (ICH) Score  Elizabeth Coma Score: 13-15  Age>=80: no  Elizabeth Coma Scale  Best Eye Response: Spontaneous  Best Verbal Response: Oriented  Best Motor Response: Follows commands  Eliana Coma Scale Score: 15    NIH Stroke Scale     NIHSS Performed Date: 24 NIHSS Performed Time: 1223   Interval: baseline  1a. Level of Consciousness: 0-->Alert, keenly responsive  1b. LOC Questions: 0-->Answers both questions correctly  1c. LOC Commands: 0-->Performs both tasks correctly  2. Best Gaze: 0-->Normal  3. Visual: 1-->Partial hemianopia  4. Facial Palsy: 0-->Normal symmetrical movements  5a. Motor Arm, Left: 0-->No drift, limb holds 90 (or 45) degrees for full 10 secs  5b. Motor Arm, Right: 0-->No drift, limb holds 90 (or 45) degrees for full 10 secs  6a. Motor Leg, Left: 0-->No drift, leg  holds 30 degree position for full 5 secs  6b. Motor Leg, Right: 0-->No drift, leg holds 30 degree position for full 5 secs  7. Limb Ataxia: 0-->Absent  8. Sensory: 0-->Normal, no sensory loss  9. Best Language: 0-->No aphasia, normal  10. Dysarthria: 0-->Normal  11. Extinction and Inattention (formerly Neglect): 0-->No abnormality  Total (NIH Stroke Scale): 1     Review of Systems     Review of Systems  Constitutional: No fatigue  HENT: Negative for nosebleeds and rhinorrhea.    Eyes: Negative for redness.   Respiratory: Negative for cough.    Gastrointestinal: Negative for anal bleeding.   Endocrine: Negative for polydipsia.   Genitourinary: Negative for enuresis and urgency.   Musculoskeletal: Negative for joint swelling.   Neurological: Negative for tremors.   Psychiatric/Behavioral: Negative for hallucinations.    Objective   Exam     Exam performed with the help of support staff from the referring site  Neurological Exam    Pain on left eye gaze   Could  not do RAPD       Result Review    Results          Personal review of CNS imaging:(Official report by radiologist pending)  Imaging  CT Imaging Review: CT Imaging reviewed, NO acute infarct/ hemorrhage seen  CTA Imaging Review: CTA Imaging reviewed, NO large vessel occlusion or severe stenosis seen  CT Perfusion Review: CT perfusion reviewed, NORMAL  ASPECTS Involved Locations: none  ASPECTS Score: 10    Thrombolytic   Thrombolytics: thrombolytic not given  Thrombolytic Exclusion Criteria: Onset unknown or GREATER than 4.5 hours     Assessment & Plan   Assessment/ Plan     Assessment:  Acute Stroke Evaluation: Stroke diagnosis uncertain- the risks of IV thrombolytic outweigh the benefits of treatment     Left eye blurry vision  - differential minor ischemic stroke( less likely) vs arteritis/ optic neuritis/any orbital pathology localised to the left eye    Plan:  -transfer to [facility] - rule out stroke  -need to get evaluated by ophthalmology   - normal  blood pressure goals  -okay for aspirin, hold off on plavix for now- if they have to perform any biopsy/procedures.          Disposition     Disposition: The patient will be transfered to another institution for further evaluation and management  Reasons for Transfer: Other (see comments) (as this is a FSED)    Medical Decision Making  Medical Data Reviewed: Data reviewed including: clinical labs, radiology and/or medical tests, Independent review of CNS images    Adonay GALARZA MD, saw the patient on 03/30/24 at 1221 for an initial in-patient or emergency room telememedicine face to face consult using interactive technology for  . The location of the patient was [place]. I was located at [place].    I have proceeded with this evaluation at the request of the referring practitioner as it is felt to be an emergency setting and no appropriate specialist is available to perform this evaluation. The originating hospital has reported that this is the correct patient and has obtained consent from the patient/surrogate to perform this telemedicine evaluation(including obtaining history, performing examination and reviewing data provided by the patient an/or originating site of care provider)    I have introduced myself to the patient, provided my credentials, disclosed my location, and determined that, based on review of the patient's chart and discussion with the patient's primary team, telemedicine via a HIPAA compliant, real-time, face-to-face two-way, interactive audio and video platform is an appropriate and effective means of providing the service.    The patient/surrogate has a right to refuse this evaluation as they have been explained risks including potential loss of confidentiality, benefits, alternatives, and the potential need for subsequent face-to-face care. In this evaluation, we will be providing recommendations only.  The ultimate decision to follow or not to follow these recommendations will be  left to the bedside treating/requesting practitioner.    The patient/surrogate has been notified that other healthcare professionals including technical person may be involved in this A/V evaluation.  All laws concerning confidentiality and patient access to medical records and copies of medical records apply to telemedicine.  The patient/surrogate has received the originating site's Health Notice of Privacy Practices.    Adonay Ge MD

## 2024-03-31 ENCOUNTER — APPOINTMENT (OUTPATIENT)
Dept: CARDIOLOGY | Facility: HOSPITAL | Age: 64
End: 2024-03-31
Payer: COMMERCIAL

## 2024-03-31 VITALS
HEART RATE: 95 BPM | HEIGHT: 65 IN | DIASTOLIC BLOOD PRESSURE: 77 MMHG | SYSTOLIC BLOOD PRESSURE: 146 MMHG | OXYGEN SATURATION: 94 % | WEIGHT: 207 LBS | RESPIRATION RATE: 16 BRPM | TEMPERATURE: 97.5 F | BODY MASS INDEX: 34.49 KG/M2

## 2024-03-31 LAB
ANION GAP SERPL CALCULATED.3IONS-SCNC: 10 MMOL/L (ref 5–15)
AORTIC DIMENSIONLESS INDEX: 0.8 (DI)
ASCENDING AORTA: 2.3 CM
BASOPHILS # BLD AUTO: 0.05 10*3/MM3 (ref 0–0.2)
BASOPHILS NFR BLD AUTO: 0.6 % (ref 0–1.5)
BH CV ECHO MEAS - ACS: 2 CM
BH CV ECHO MEAS - AO MAX PG: 13 MMHG
BH CV ECHO MEAS - AO MEAN PG: 7 MMHG
BH CV ECHO MEAS - AO ROOT DIAM: 2.3 CM
BH CV ECHO MEAS - AO V2 MAX: 180 CM/SEC
BH CV ECHO MEAS - AO V2 VTI: 30.2 CM
BH CV ECHO MEAS - AVA(I,D): 2.36 CM2
BH CV ECHO MEAS - EDV(CUBED): 22 ML
BH CV ECHO MEAS - EDV(MOD-SP2): 23.1 ML
BH CV ECHO MEAS - EDV(MOD-SP4): 41.6 ML
BH CV ECHO MEAS - EF(MOD-BP): 59.6 %
BH CV ECHO MEAS - EF(MOD-SP2): 63.4 %
BH CV ECHO MEAS - EF(MOD-SP4): 61.3 %
BH CV ECHO MEAS - ESV(CUBED): 6.9 ML
BH CV ECHO MEAS - ESV(MOD-SP2): 8.5 ML
BH CV ECHO MEAS - ESV(MOD-SP4): 16.1 ML
BH CV ECHO MEAS - FS: 32.1 %
BH CV ECHO MEAS - IVS/LVPW: 1.11 CM
BH CV ECHO MEAS - IVSD: 1 CM
BH CV ECHO MEAS - LA DIMENSION: 2.9 CM
BH CV ECHO MEAS - LAT PEAK E' VEL: 7.9 CM/SEC
BH CV ECHO MEAS - LV DIASTOLIC VOL/BSA (35-75): 20.7 CM2
BH CV ECHO MEAS - LV MASS(C)D: 68.7 GRAMS
BH CV ECHO MEAS - LV MAX PG: 9.1 MMHG
BH CV ECHO MEAS - LV MEAN PG: 4 MMHG
BH CV ECHO MEAS - LV SYSTOLIC VOL/BSA (12-30): 8 CM2
BH CV ECHO MEAS - LV V1 MAX: 151 CM/SEC
BH CV ECHO MEAS - LV V1 VTI: 25.1 CM
BH CV ECHO MEAS - LVIDD: 2.8 CM
BH CV ECHO MEAS - LVIDS: 1.9 CM
BH CV ECHO MEAS - LVOT AREA: 2.8 CM2
BH CV ECHO MEAS - LVOT DIAM: 1.9 CM
BH CV ECHO MEAS - LVPWD: 0.9 CM
BH CV ECHO MEAS - MED PEAK E' VEL: 5.3 CM/SEC
BH CV ECHO MEAS - MV A DUR: 0.13 SEC
BH CV ECHO MEAS - MV A MAX VEL: 103 CM/SEC
BH CV ECHO MEAS - MV DEC SLOPE: 264 CM/SEC2
BH CV ECHO MEAS - MV DEC TIME: 306 SEC
BH CV ECHO MEAS - MV E MAX VEL: 65 CM/SEC
BH CV ECHO MEAS - MV E/A: 0.63
BH CV ECHO MEAS - MV MAX PG: 5.2 MMHG
BH CV ECHO MEAS - MV MEAN PG: 2 MMHG
BH CV ECHO MEAS - MV P1/2T: 93.3 MSEC
BH CV ECHO MEAS - MV V2 VTI: 24.5 CM
BH CV ECHO MEAS - MVA(P1/2T): 2.36 CM2
BH CV ECHO MEAS - MVA(VTI): 2.9 CM2
BH CV ECHO MEAS - PA ACC TIME: 0.13 SEC
BH CV ECHO MEAS - PA V2 MAX: 120.5 CM/SEC
BH CV ECHO MEAS - PULM A REVS DUR: 0.14 SEC
BH CV ECHO MEAS - PULM A REVS VEL: 52.7 CM/SEC
BH CV ECHO MEAS - PULM DIAS VEL: 45.3 CM/SEC
BH CV ECHO MEAS - PULM S/D: 1.3
BH CV ECHO MEAS - PULM SYS VEL: 59.1 CM/SEC
BH CV ECHO MEAS - RVDD: 2.8 CM
BH CV ECHO MEAS - RVOT DIAM: 2.8 CM
BH CV ECHO MEAS - SI(MOD-SP2): 7.3 ML/M2
BH CV ECHO MEAS - SI(MOD-SP4): 12.7 ML/M2
BH CV ECHO MEAS - SV(LVOT): 71.2 ML
BH CV ECHO MEAS - SV(MOD-SP2): 14.7 ML
BH CV ECHO MEAS - SV(MOD-SP4): 25.5 ML
BH CV ECHO MEAS - TAPSE (>1.6): 2.9 CM
BH CV ECHO MEASUREMENTS AVERAGE E/E' RATIO: 9.85
BH CV XLRA - RV BASE: 1.9 CM
BH CV XLRA - RV LENGTH: 5.9 CM
BH CV XLRA - RV MID: 2.2 CM
BH CV XLRA - TDI S': 11.6 CM/SEC
BUN SERPL-MCNC: 16 MG/DL (ref 8–23)
BUN/CREAT SERPL: 20 (ref 7–25)
CALCIUM SPEC-SCNC: 9.9 MG/DL (ref 8.6–10.5)
CHLORIDE SERPL-SCNC: 99 MMOL/L (ref 98–107)
CO2 SERPL-SCNC: 28 MMOL/L (ref 22–29)
CREAT SERPL-MCNC: 0.8 MG/DL (ref 0.57–1)
DEPRECATED RDW RBC AUTO: 40.1 FL (ref 37–54)
EGFRCR SERPLBLD CKD-EPI 2021: 82.9 ML/MIN/1.73
EOSINOPHIL # BLD AUTO: 0.16 10*3/MM3 (ref 0–0.4)
EOSINOPHIL NFR BLD AUTO: 2 % (ref 0.3–6.2)
ERYTHROCYTE [DISTWIDTH] IN BLOOD BY AUTOMATED COUNT: 12.2 % (ref 12.3–15.4)
GLUCOSE BLDC GLUCOMTR-MCNC: 229 MG/DL (ref 70–105)
GLUCOSE SERPL-MCNC: 195 MG/DL (ref 65–99)
HCT VFR BLD AUTO: 46.1 % (ref 34–46.6)
HGB BLD-MCNC: 14.8 G/DL (ref 12–15.9)
IMM GRANULOCYTES # BLD AUTO: 0.05 10*3/MM3 (ref 0–0.05)
IMM GRANULOCYTES NFR BLD AUTO: 0.6 % (ref 0–0.5)
LEFT ATRIUM VOLUME INDEX: 10.8 ML/M2
LYMPHOCYTES # BLD AUTO: 2.79 10*3/MM3 (ref 0.7–3.1)
LYMPHOCYTES NFR BLD AUTO: 34.6 % (ref 19.6–45.3)
MAGNESIUM SERPL-MCNC: 1.9 MG/DL (ref 1.6–2.4)
MCH RBC QN AUTO: 28.4 PG (ref 26.6–33)
MCHC RBC AUTO-ENTMCNC: 32.1 G/DL (ref 31.5–35.7)
MCV RBC AUTO: 88.5 FL (ref 79–97)
MONOCYTES # BLD AUTO: 0.66 10*3/MM3 (ref 0.1–0.9)
MONOCYTES NFR BLD AUTO: 8.2 % (ref 5–12)
NEUTROPHILS NFR BLD AUTO: 4.35 10*3/MM3 (ref 1.7–7)
NEUTROPHILS NFR BLD AUTO: 54 % (ref 42.7–76)
NRBC BLD AUTO-RTO: 0 /100 WBC (ref 0–0.2)
PLATELET # BLD AUTO: 238 10*3/MM3 (ref 140–450)
PMV BLD AUTO: 10.1 FL (ref 6–12)
POTASSIUM SERPL-SCNC: 4.8 MMOL/L (ref 3.5–5.2)
RBC # BLD AUTO: 5.21 10*6/MM3 (ref 3.77–5.28)
SINUS: 2.3 CM
SODIUM SERPL-SCNC: 137 MMOL/L (ref 136–145)
STJ: 2 CM
VIT B12 BLD-MCNC: 316 PG/ML (ref 211–946)
WBC NRBC COR # BLD AUTO: 8.06 10*3/MM3 (ref 3.4–10.8)

## 2024-03-31 PROCEDURE — 80048 BASIC METABOLIC PNL TOTAL CA: CPT | Performed by: FAMILY MEDICINE

## 2024-03-31 PROCEDURE — G0378 HOSPITAL OBSERVATION PER HR: HCPCS

## 2024-03-31 PROCEDURE — 82948 REAGENT STRIP/BLOOD GLUCOSE: CPT

## 2024-03-31 PROCEDURE — 63710000001 INSULIN LISPRO (HUMAN) PER 5 UNITS: Performed by: FAMILY MEDICINE

## 2024-03-31 PROCEDURE — 93306 TTE W/DOPPLER COMPLETE: CPT | Performed by: INTERNAL MEDICINE

## 2024-03-31 PROCEDURE — 83735 ASSAY OF MAGNESIUM: CPT | Performed by: FAMILY MEDICINE

## 2024-03-31 PROCEDURE — 85025 COMPLETE CBC W/AUTO DIFF WBC: CPT | Performed by: FAMILY MEDICINE

## 2024-03-31 PROCEDURE — 93306 TTE W/DOPPLER COMPLETE: CPT

## 2024-03-31 RX ADMIN — INSULIN LISPRO 3 UNITS: 100 INJECTION, SOLUTION INTRAVENOUS; SUBCUTANEOUS at 09:07

## 2024-03-31 RX ADMIN — Medication 10 ML: at 09:07

## 2024-03-31 RX ADMIN — ERYTHROMYCIN 1 APPLICATION: 5 OINTMENT OPHTHALMIC at 05:28

## 2024-03-31 NOTE — PLAN OF CARE
Problem: Adult Inpatient Plan of Care  Goal: Plan of Care Review  Outcome: Met  Goal: Patient-Specific Goal (Individualized)  Outcome: Met  Goal: Absence of Hospital-Acquired Illness or Injury  Outcome: Met  Intervention: Identify and Manage Fall Risk  Recent Flowsheet Documentation  Taken 3/31/2024 1000 by Oumou Diamond LPN  Safety Promotion/Fall Prevention:   activity supervised   assistive device/personal items within reach  Taken 3/31/2024 0800 by Oumou Diamond LPN  Safety Promotion/Fall Prevention:   activity supervised   assistive device/personal items within reach  Intervention: Prevent Skin Injury  Recent Flowsheet Documentation  Taken 3/31/2024 0800 by Oumou Diamond LPN  Body Position: position changed independently  Intervention: Prevent and Manage VTE (Venous Thromboembolism) Risk  Recent Flowsheet Documentation  Taken 3/31/2024 0800 by Oumou Diamond LPN  Activity Management: up ad damien  VTE Prevention/Management:   bilateral   sequential compression devices off   patient refused intervention  Intervention: Prevent Infection  Recent Flowsheet Documentation  Taken 3/31/2024 1000 by Oumou Diamond LPN  Infection Prevention:   environmental surveillance performed   equipment surfaces disinfected  Taken 3/31/2024 0800 by Oumou Diamond LPN  Infection Prevention:   environmental surveillance performed   equipment surfaces disinfected  Goal: Optimal Comfort and Wellbeing  Outcome: Met  Goal: Readiness for Transition of Care  Outcome: Met     Problem: Diabetes Comorbidity  Goal: Blood Glucose Level Within Targeted Range  Outcome: Met     Problem: Hypertension Comorbidity  Goal: Blood Pressure in Desired Range  Outcome: Met  Intervention: Maintain Blood Pressure Management  Recent Flowsheet Documentation  Taken 3/31/2024 1000 by Oumou Diamond LPN  Medication Review/Management: medications reviewed  Taken 3/31/2024 0800 by Oumou Diamond LPN  Medication Review/Management: medications  reviewed     Problem: Adjustment to Illness (Stroke, Ischemic/Transient Ischemic Attack)  Goal: Optimal Coping  Outcome: Met     Problem: Bowel Elimination Impaired (Stroke, Ischemic/Transient Ischemic Attack)  Goal: Effective Bowel Elimination  Outcome: Met     Problem: Cerebral Tissue Perfusion (Stroke, Ischemic/Transient Ischemic Attack)  Goal: Optimal Cerebral Tissue Perfusion  Outcome: Met     Problem: Cognitive Impairment (Stroke, Ischemic/Transient Ischemic Attack)  Goal: Optimal Cognitive Function  Outcome: Met     Problem: Communication Impairment (Stroke, Ischemic/Transient Ischemic Attack)  Goal: Improved Communication Skills  Outcome: Met     Problem: Functional Ability Impaired (Stroke, Ischemic/Transient Ischemic Attack)  Goal: Optimal Functional Ability  Outcome: Met  Intervention: Optimize Functional Ability  Recent Flowsheet Documentation  Taken 3/31/2024 0800 by Oumou Diamond LPN  Activity Management: up ad damien     Problem: Respiratory Compromise (Stroke, Ischemic/Transient Ischemic Attack)  Goal: Effective Oxygenation and Ventilation  Outcome: Met     Problem: Sensorimotor Impairment (Stroke, Ischemic/Transient Ischemic Attack)  Goal: Improved Sensorimotor Function  Outcome: Met     Problem: Swallowing Impairment (Stroke, Ischemic/Transient Ischemic Attack)  Goal: Optimal Eating and Swallowing without Aspiration  Outcome: Met     Problem: Urinary Elimination Impaired (Stroke, Ischemic/Transient Ischemic Attack)  Goal: Effective Urinary Elimination  Outcome: Met   Goal Outcome Evaluation:

## 2024-03-31 NOTE — DISCHARGE SUMMARY
Discharge Summary    Date of Service: 3/31  Patient Name: Rita Hussein  : 1960  MRN: [id number]    Date of Admission: 3/30/2024  Discharge Diagnosis: Patient complaining of left eye hurting with some blurred vision  Her workup was negative for TIA or CVA with normal MRI  Patient was on aspirin and Plavix prior to admission  Seen by neurology and cleared for discharge vision is  Within normal limit day of discharge  Patient with history of diabetes hypertension  Seen in consult by neurology  Date of Discharge:  3/31  Primary Care Physician: Monserrat Reddy APRN      Presenting Problem:   Hemianopsia [H53.47]  Left corneal abrasion, initial encounter [S05.02XA]  Hemianopia of left eye [H53.462]    Active and Resolved Hospital Problems:  Active Hospital Problems    Diagnosis POA    **Hemianopsia [H53.47] Yes      Resolved Hospital Problems   No resolved problems to display.         Hospital Course     Hospital Course:  Rita Hussein is a 63 y.o. female who woke up on the day of admission on   With  left eye discomfort and felt some blurred vision  This has resolved  There was no slurred speech and no focal weakness  Her workup done in the hospital stay was negative including MRI of the brain  Seen by neurology and was cleared for discharge        DISCHARGE Follow Up Recommendations for labs and diagnostics: pcp in 3 d awake alert vented x 3        Reasons For Change In Medications and Indications for New Medications:      Day of Discharge     Vital Signs:  Temp:  [97.5 °F (36.4 °C)-98.5 °F (36.9 °C)] 97.5 °F (36.4 °C)  Heart Rate:  [] 99  Resp:  [10-19] 16  BP: (113-154)/(50-92) 130/92    Physical Exam:  Physical Exam     alert vented x 3  HEENT exam is normal  Neck supple  Chest is adulteration  Heart S1 is 2 no murmur  Abdomen soft benign nontender bowel sounds positive  Neuroexam is nonfocal    Pertinent  and/or Most Recent Results     LAB RESULTS:      Lab 24  0217  03/30/24  1812 03/30/24  1152 03/30/24  1114 03/30/24  1047   WBC 8.06 6.70  --   --  7.68   HEMOGLOBIN 14.8 15.5  --   --  15.8   HEMATOCRIT 46.1 47.3*  --   --  49.1*   PLATELETS 238 280  --   --  249   NEUTROS ABS 4.35 4.63  --   --  4.56   IMMATURE GRANS (ABS) 0.05 0.04  --   --  0.04   LYMPHS ABS 2.79 1.41  --   --  2.42   MONOS ABS 0.66 0.45  --   --  0.51   EOS ABS 0.16 0.13  --   --  0.13   MCV 88.5 86.2  --   --  87.4   PROTIME  --   --  14.2  --   --    APTT  --   --   --  27.7  --          Lab 03/31/24  0217 03/30/24  1812 03/30/24  1047   SODIUM 137 135* 132*   POTASSIUM 4.8 4.4 4.1   CHLORIDE 99 97* 95*   CO2 28.0 24.0 23.7   ANION GAP 10.0 14.0 13.3   BUN 16 11 12   CREATININE 0.80 0.77 0.84   EGFR 82.9 86.8 78.2   GLUCOSE 195* 175* 262*   CALCIUM 9.9 9.8 9.7   MAGNESIUM 1.9 1.8  --    HEMOGLOBIN A1C  --  10.10*  --    TSH  --  1.020  --          Lab 03/30/24  1047   TOTAL PROTEIN 6.9   ALBUMIN 4.5   GLOBULIN 2.4   ALT (SGPT) 27   AST (SGOT) 17   BILIRUBIN 0.6   ALK PHOS 101         Lab 03/30/24  1152 03/30/24  1047   HSTROP T  --  8   PROTIME 14.2  --    INR 1.3*  --          Lab 03/30/24  1812   CHOLESTEROL 158   LDL CHOL 74   HDL CHOL 41   TRIGLYCERIDES 267*             Brief Urine Lab Results       None          Microbiology Results (last 10 days)       ** No results found for the last 240 hours. **            MRI Brain With & Without Contrast    Result Date: 3/30/2024  Impression: Impression: 1.No acute process identified 2.Confluent white matter changes most frequently seen in the setting of microvascular ischemic disease although nonspecific. Electronically Signed: Alberto Soliz MD  3/30/2024 5:51 PM EDT  Workstation ID: [id number]    CT Angiogram Head w AI Analysis of LVO    Result Date: 3/30/2024  Impression: 1.No acute abnormality identified within the large arteries of the head or neck. 2.No significant stenosis of the bilateral internal carotid arteries. Electronically Signed: Jason Renteria  MD  3/30/2024 12:20 PM EDT  Workstation ID: [id number]    CT Angiogram Neck    Result Date: 3/30/2024  Impression: 1.No acute abnormality identified within the large arteries of the head or neck. 2.No significant stenosis of the bilateral internal carotid arteries. Electronically Signed: Jason Renteria MD  3/30/2024 12:20 PM EDT  Workstation ID: [id number]    CT CEREBRAL PERFUSION WITH & WITHOUT CONTRAST    Result Date: 3/30/2024  Impression: 1. No CT perfusion evidence of acute cerebral ischemia or core infarct. Electronically Signed: Steve Leon MD  3/30/2024 12:07 PM EDT  Workstation ID: [id number]    CT Head Without Contrast Stroke Protocol    Result Date: 3/30/2024  Impression: 1.No acute intracranial abnormality is identified. 2.Findings compatible with chronic microvascular ischemic change. Electronically Signed: Jason Renteria MD  3/30/2024 11:56 AM EDT  Workstation ID: [id number]    XR Chest 1 View    Result Date: 3/30/2024  Impression: Impression: 1.No acute radiographic abnormality is identified. Electronically Signed: Jason Renteria MD  3/30/2024 11:06 AM EDT  Workstation ID: [id number]                 Labs Pending at Discharge:  Pending Labs       Order Current Status    Vitamin B12 In process            Procedures Performed           Consults:   Consults       Date and Time Order Name Status Description    3/30/2024  4:45 PM Inpatient Ophthalmology Consult      3/30/2024  4:28 PM Inpatient Neurology Consult Stroke      3/30/2024 11:39 AM Inpatient Neurology Consult Stroke      3/30/2024 11:39 AM Inpatient Neurology Consult Stroke                Discharge Details        Discharge Medications        Continue These Medications        Instructions Start Date   Accu-Chek Softclix Lancets lancets   See Admin Instructions             ASK your doctor about these medications        Instructions Start Date   atorvastatin 40 MG tablet  Commonly known as: LIPITOR   TAKE ONE (1) TABLET BY MOUTH EVERY EVENING       clopidogrel 75 MG tablet  Commonly known as: PLAVIX   TAKE ONE (1) TABLET BY MOUTH EVERY DAY      hydrOXYzine 25 MG tablet  Commonly known as: ATARAX   25 mg, Oral, 2 Times Daily PRN      Jardiance 25 MG tablet tablet  Generic drug: empagliflozin   TAKE ONE (1) TABLET BY MOUTH EVERY DAY      lisinopril 10 MG tablet  Commonly known as: PRINIVIL,ZESTRIL   TAKE ONE (1) TABLET BY MOUTH EVERY DAY      loratadine 10 MG tablet  Commonly known as: CLARITIN   10 mg, Oral, Daily      metFORMIN  MG 24 hr tablet  Commonly known as: GLUCOPHAGE-XR   TAKE TWO (2) TABLETS BY MOUTH EVERY DAY WITH breakfast      sertraline 100 MG tablet  Commonly known as: Zoloft   100 mg, Oral, Nightly               No Known Allergies      Discharge Disposition: home      Diet:  Hospital:  Diet Order   Procedures    Diet: Cardiac, Diabetic; Healthy Heart (2-3 Na+); Consistent Carbohydrate; Fluid Consistency: Thin (IDDSI 0)         Discharge Activity:         CODE STATUS:  Code Status and Medical Interventions:   Ordered at: 03/30/24 1628     Code Status (Patient has no pulse and is not breathing):    CPR (Attempt to Resuscitate)     Medical Interventions (Patient has pulse or is breathing):    Full Support         Future Appointments   Date Time Provider Department Center   5/21/2024 11:15 AM Santa Echols DNP, APRN [facility]           Time spent on Discharge including face to face service:  35 minutes        Signature: Electronically signed by Alma Rosa Emanuel MD, 03/31/24, 09:19 EDT.  [facility] Hospitalist Team

## 2024-03-31 NOTE — CASE MANAGEMENT/SOCIAL WORK
Continued Stay Note  FLOR[facility]     Patient Name: Rita Hussein  MRN: [id number]  Today's Date: 3/31/2024    Admit Date: 3/30/2024        Discharge Plan       Row Name 03/31/24 1814       Plan    Final Discharge Disposition Code 01 - home or self-care    Final Note home                    Expected Discharge Date and Time       Expected Discharge Date Expected Discharge Time    Mar 31, 2024               Dania Kyle RN

## 2024-04-01 NOTE — PAYOR COMM NOTE
"      [facility] HOSPITAL MEDICAL AUTH REQUEST    Please advise if additional clinical is needed to process this request.  Thank you!    Myrna Zimmerman  Utilization Review Coordinator  [address]  [address]  Ph: [phone]  Fx: [phone]             PritchettMichaelRita (63 y.o. Female)       Date of Birth   1960    Social Security Number       Address   [address] [place] [address]    Home Phone   [phone]    MRN   [id number]       Yazidism   Unknown    Marital Status                               Admission Date   3/30/24    Admission Type   Urgent    Admitting Provider   Ion Woody MD    Attending Provider       Department, Room/Bed   [facility] NEURO,        Discharge Date   3/31/2024    Discharge Disposition   Home or Self Care    Discharge Destination                                 Attending Provider: (none)   Allergies: No Known Allergies    Isolation: None   Infection: None   Code Status: Prior    Ht: 165.1 cm (65\")   Wt: 93.9 kg (207 lb)    Admission Cmt: None   Principal Problem: Hemianopsia [H53.47]                   Active Insurance as of 3/30/2024       Primary Coverage       Payor Plan Insurance Group Employer/Plan Group    AMBETTER MHS IND EXCHANGE AM[employer]TER [employer] IND EXCHANGE AMBIN       Payor Plan Address Payor Plan Phone Number Payor Plan Fax Number Effective Dates    [address] [phone]  2023 - None Entered    [facility] [id number]         Subscriber Name Subscriber Birth Date Member ID       MIKAELAKATERINARITA 1960 [id number]                     Emergency Contacts        (Rel.) Home Phone Work Phone Mobile Phone    OHLLIE SHOEMAKER (Friend) [phone] -- --                 History & Physical        Ion Woody MD at [address] [place] [address] Medicine Services  History & Physical    Patient Name: Rita Jovita  : " 1960  MRN: [id number]  Primary Care Physician:  Monserrat Reddy APRN  Date of admission: 3/30/2024  Date and Time of Service: 3/30/2024 at 1639    Subjective      Chief Complaint: vision change    History of Present Illness: Rita Hussein is a 63 y.o. female with a CMH of hypertension, hyperlipidemia, diabetes, CVA presenting to the hospital with vision changes on her left side.  Patient mostly describes it as a blurry vision and was also having pain in the left eye as well.  Patient did not have any focal weakness or any numbness tingling or any facial drooping.    Review of Systems   Respiratory:  Negative for shortness of breath.        Personal History     Past Medical History:   Diagnosis Date    Diabetes        History reviewed. No pertinent surgical history.    Family History: family history is not on file. Otherwise pertinent FHx was reviewed and not pertinent to current issue.    Social History:  reports that she has been smoking cigarettes. She has never used smokeless tobacco. She reports that she does not drink alcohol and does not use drugs.    Home Medications:  Prior to Admission Medications       Prescriptions Last Dose Informant Patient Reported? Taking?    Accu-Chek Softclix Lancets lancets   Yes No    See Admin Instructions.    atorvastatin (LIPITOR) 40 MG tablet   Yes No    TAKE ONE (1) TABLET BY MOUTH EVERY EVENING    clopidogrel (PLAVIX) 75 MG tablet   Yes No    TAKE ONE (1) TABLET BY MOUTH EVERY DAY    hydrOXYzine (ATARAX) 25 MG tablet   No No    Take 1 tablet by mouth 2 (Two) Times a Day As Needed for Anxiety.    Jardiance 25 MG tablet tablet   Yes No    TAKE ONE (1) TABLET BY MOUTH EVERY DAY    lisinopril (PRINIVIL,ZESTRIL) 10 MG tablet   Yes No    TAKE ONE (1) TABLET BY MOUTH EVERY DAY    loratadine (CLARITIN) 10 MG tablet   Yes No    Take 1 tablet by mouth Daily.    metFORMIN ER (GLUCOPHAGE-XR) 500 MG 24 hr tablet   Yes No    TAKE TWO (2) TABLETS BY MOUTH EVERY DAY WITH breakfast     sertraline (Zoloft) 100 MG tablet   No No    Take 1 tablet by mouth Every Night.              Allergies:  No Known Allergies    Objective      Vitals:   Temp:  [98.5 °F (36.9 °C)] 98.5 °F (36.9 °C)  Heart Rate:  [] 99  Resp:  [16-19] 19  BP: (129-154)/(66-89) 134/66  Body mass index is 33.78 kg/m².  Physical Exam  Vitals and nursing note reviewed.   Constitutional:       General: She is not in acute distress.     Appearance: She is well-developed. She is not diaphoretic.   HENT:      Head: Normocephalic and atraumatic.   Cardiovascular:      Rate and Rhythm: Normal rate.   Pulmonary:      Effort: Pulmonary effort is normal. No respiratory distress.      Breath sounds: No wheezing.   Abdominal:      General: There is no distension.      Palpations: Abdomen is soft.   Musculoskeletal:         General: Normal range of motion.   Skin:     General: Skin is warm and dry.   Neurological:      General: No focal deficit present.      Mental Status: She is alert.      Cranial Nerves: No cranial nerve deficit.   Psychiatric:         Behavior: Behavior normal.         Thought Content: Thought content normal.         Judgment: Judgment normal.         Diagnostic Data:  Lab Results (last 24 hours)       Procedure Component Value Units Date/Time    [place] Draw [[id number]] Collected: 03/30/24 1047    Specimen: Blood Updated: 03/30/24 1216    Narrative:      The following orders were created for panel order [place] Draw.  Procedure                               Abnormality         Status                     ---------                               -----------         ------                     Green Top (Gel)[[id number]]                                                             Lavender Top[[id number]]                                     Final result               Gold Top - SST[[id number]]                                                              Light Blue Top[[id number]]                                   Final result                Green Top (Gel)[[id number]]                                  Final result                 Please view results for these tests on the individual orders.    Light Blue Top [[id number]] Collected: 03/30/24 1114    Specimen: Blood Updated: 03/30/24 1216     Extra Tube Hold for add-ons.     Comment: Auto resulted       Single High Sensitivity Troponin T [[id number]]  (Normal) Collected: 03/30/24 1047    Specimen: Blood Updated: 03/30/24 1201     HS Troponin T 8 ng/L     Narrative:      High Sensitive Troponin T Reference Range:  <14.0 ng/L- Negative Female for AMI  <22.0 ng/L- Negative Male for AMI  >=14 - Abnormal Female indicating possible myocardial injury.  >=22 - Abnormal Male indicating possible myocardial injury.   Clinicians would have to utilize clinical acumen, EKG, Troponin, and serial changes to determine if it is an Acute Myocardial Infarction or myocardial injury due to an underlying chronic condition.         Lavender Top [[id number]] Collected: 03/30/24 1047    Specimen: Blood Updated: 03/30/24 1200     Extra Tube hold for add-on     Comment: Auto resulted       Green Top (Gel) [[id number]] Collected: 03/30/24 1047    Specimen: Blood Updated: 03/30/24 1200     Extra Tube Hold for add-ons.     Comment: Auto resulted.       POC Protime / INR [[id number]]  (Abnormal) Collected: 03/30/24 1152    Specimen: Blood Updated: 03/30/24 1159     Protime 14.2 seconds      INR 1.3    aPTT [[id number]] Collected: 03/30/24 1114    Specimen: Blood Updated: 03/30/24 1153    Comprehensive Metabolic Panel [[id number]]  (Abnormal) Collected: 03/30/24 1047    Specimen: Blood Updated: 03/30/24 1121     Glucose 262 mg/dL      BUN 12 mg/dL      Creatinine 0.84 mg/dL      Sodium 132 mmol/L      Potassium 4.1 mmol/L      Chloride 95 mmol/L      CO2 23.7 mmol/L      Calcium 9.7 mg/dL      Total Protein 6.9 g/dL      Albumin 4.5 g/dL      ALT (SGPT) 27 U/L      AST (SGOT) 17 U/L      Alkaline Phosphatase 101 U/L      Total  Bilirubin 0.6 mg/dL      Globulin 2.4 gm/dL      A/G Ratio 1.9 g/dL      BUN/Creatinine Ratio 14.3     Anion Gap 13.3 mmol/L      eGFR 78.2 mL/min/1.73     Narrative:      GFR Normal >60  Chronic Kidney Disease <60  Kidney Failure <15      CBC & Differential [[id number]]  (Abnormal) Collected: 03/30/24 1047    Specimen: Blood Updated: 03/30/24 1054    Narrative:      The following orders were created for panel order CBC & Differential.  Procedure                               Abnormality         Status                     ---------                               -----------         ------                     CBC Auto Differential[[id number]]        Abnormal            Final result                 Please view results for these tests on the individual orders.    CBC Auto Differential [[id number]]  (Abnormal) Collected: 03/30/24 1047    Specimen: Blood Updated: 03/30/24 1054     WBC 7.68 10*3/mm3      RBC 5.62 10*6/mm3      Hemoglobin 15.8 g/dL      Hematocrit 49.1 %      MCV 87.4 fL      MCH 28.1 pg      MCHC 32.2 g/dL      RDW 12.3 %      RDW-SD 40.2 fl      MPV 9.4 fL      Platelets 249 10*3/mm3      Neutrophil % 59.4 %      Lymphocyte % 31.5 %      Monocyte % 6.6 %      Eosinophil % 1.7 %      Basophil % 0.3 %      Immature Grans % 0.5 %      Neutrophils, Absolute 4.56 10*3/mm3      Lymphocytes, Absolute 2.42 10*3/mm3      Monocytes, Absolute 0.51 10*3/mm3      Eosinophils, Absolute 0.13 10*3/mm3      Basophils, Absolute 0.02 10*3/mm3      Immature Grans, Absolute 0.04 10*3/mm3     POC Glucose Once [[id number]]  (Abnormal) Collected: 03/30/24 1049    Specimen: Blood Updated: 03/30/24 1051     Glucose 267 mg/dL      Comment: Serial Number: [id number]:  [id number]                Imaging Results (Last 24 Hours)       Procedure Component Value Units Date/Time    CT Angiogram Head w AI Analysis of LVO [[id number]] Collected: 03/30/24 1211     Updated: 03/30/24 1222    Narrative:      CT ANGIOGRAM HEAD W AI ANALYSIS OF  LVO, CT ANGIOGRAM NECK    Date of Exam: 3/30/2024 11:55 AM EDT    Indication: Neuro deficit, acute stroke suspected.    Comparison: None available.    Technique: CTA of the head was performed after the uneventful intravenous administration of iodinated contrast. Reconstructed coronal and sagittal images were also obtained. In addition, a 3-D volume rendered image was created for interpretation.   Automated exposure control and iterative reconstruction methods were used.    FINDINGS:    Vascular Findings:    The right common carotid, internal carotid, middle cerebral, anterior cerebral, vertebral, and posterior cerebral arteries are patent without abrupt cut off or aneurysmal dilation.    The left common carotid, internal carotid, middle cerebral, anterior cerebral, vertebral, and posterior cerebral arteries are patent without abrupt cut off or aneurysmal dilation.    Limited visualization of the bilateral vertebral artery origins due to motion artifact.    Basilar artery appears patent and appears unremarkable.    Non-vascular Findings:    For description of nonvascular intracranial findings, please refer to the noncontrast head CT performed the same date.    No acute abnormality is identified within the visualized soft tissue or bony structures of the neck.    The visualized lung apices are clear.      Impression:      1.No acute abnormality identified within the large arteries of the head or neck.  2.No significant stenosis of the bilateral internal carotid arteries.      Electronically Signed: Jason Renteria MD    3/30/2024 12:20 PM EDT    Workstation ID: [id number]    CT Angiogram Neck [[id number]] Collected: 03/30/24 1211     Updated: 03/30/24 1222    Narrative:      CT ANGIOGRAM HEAD W AI ANALYSIS OF LVO, CT ANGIOGRAM NECK    Date of Exam: 3/30/2024 11:55 AM EDT    Indication: Neuro deficit, acute stroke suspected.    Comparison: None available.    Technique: CTA of the head was performed after the uneventful  intravenous administration of iodinated contrast. Reconstructed coronal and sagittal images were also obtained. In addition, a 3-D volume rendered image was created for interpretation.   Automated exposure control and iterative reconstruction methods were used.    FINDINGS:    Vascular Findings:    The right common carotid, internal carotid, middle cerebral, anterior cerebral, vertebral, and posterior cerebral arteries are patent without abrupt cut off or aneurysmal dilation.    The left common carotid, internal carotid, middle cerebral, anterior cerebral, vertebral, and posterior cerebral arteries are patent without abrupt cut off or aneurysmal dilation.    Limited visualization of the bilateral vertebral artery origins due to motion artifact.    Basilar artery appears patent and appears unremarkable.    Non-vascular Findings:    For description of nonvascular intracranial findings, please refer to the noncontrast head CT performed the same date.    No acute abnormality is identified within the visualized soft tissue or bony structures of the neck.    The visualized lung apices are clear.      Impression:      1.No acute abnormality identified within the large arteries of the head or neck.  2.No significant stenosis of the bilateral internal carotid arteries.      Electronically Signed: Jason Renteria MD    3/30/2024 12:20 PM EDT    Workstation ID: [id number]    CT CEREBRAL PERFUSION WITH & WITHOUT CONTRAST [[id number]] Collected: 03/30/24 1206     Updated: 03/30/24 1209    Narrative:      CT CEREBRAL PERFUSION W WO CONTRAST    Date of Exam: 3/30/2024 11:55 AM EDT    Indication: Neuro deficit, acute, stroke suspected.     Comparison: CT head 3/30/2024    Technique: Axial CT images of the brain were obtained prior to and after the administration of iodinated contrast. CT Perfusion protocol was utilized. Automated post processing was performed by RAPID software and submitted to PACS for interpretation.   Automated  exposure control and iterative reconstruction was utilized.      Findings:            Cerebral blood flow, blood volume and mean transit time maps are symmetric without large perfusion defect.    CBF<30% volume: 0 mL  Tmax>6sec volume: 0 mL  Mismatch volume: 0 mL  Mismatch ratio: None              Impression:        1. No CT perfusion evidence of acute cerebral ischemia or core infarct.            Electronically Signed: Steve Leon MD    3/30/2024 12:07 PM EDT    Workstation ID: [id number]    CT Head Without Contrast Stroke Protocol [[id number]] Collected: 03/30/24 1151     Updated: 03/30/24 1158    Narrative:      CT HEAD WO CONTRAST STROKE PROTOCOL    Date of Exam: 3/30/2024 11:43 AM EDT    Indication: Neuro deficit, acute, stroke suspected  Neuro deficit, acute stroke suspected.    Comparison: None available.    Technique: Axial CT images were obtained of the head without contrast administration.  Reconstructed coronal and sagittal images were also obtained. Automated exposure control and iterative construction methods were used.    Scan Time: 3/30/2024 at 11:48 a.m.  Results discussed with Dr. Garcia via telephone on 3/30/2024 11:55 AM EDT.      FINDINGS:    Foci of periventricular and subcortical white matter hypoattenuation are consistent with chronic microvascular ischemic change. No significant mass effect, midline shift, intracranial hemorrhage, or hydrocephalus is identified. No extra-axial fluid   collection is identified.   The calvarium and overlying soft tissues are unremarkable. The paranasal sinuses and bilateral mastoid air cells are adequately aerated. The visualized bony orbits, globes, and retrobulbar soft tissues are unremarkable.      Impression:      1.No acute intracranial abnormality is identified.  2.Findings compatible with chronic microvascular ischemic change.        Electronically Signed: Jason Renteria MD    3/30/2024 11:56 AM EDT    Workstation ID: [id number]    XR Chest 1 View  [[id number]] Collected: 03/30/24 1105     Updated: 03/30/24 1108    Narrative:      XR CHEST 1 VW    Date of Exam: 3/30/2024 10:45 AM EDT    Indication: Stroke Protocol (Onset > 12 hrs)    Comparison: None available.    Findings:  Loop recorder projects over the left thorax. The lungs appear adequately aerated without consolidation or mass. No pleural effusion or pneumothorax is identified. The cardiomediastinal silhouette and pulmonary vasculature appear within normal limits. No   acute or suspicious osseous lesion is identified. Chronic remodeling of the right shoulder.      Impression:      Impression:  1.No acute radiographic abnormality is identified.      Electronically Signed: Jason Renteria MD    3/30/2024 11:06 AM EDT    Workstation ID: [id number]              Assessment & Plan        This is a 63 y.o. female with:    Active and Resolved Problems  Active Hospital Problems    Diagnosis  POA    **Hemianopsia [H53.47]  Yes      Resolved Hospital Problems   No resolved problems to display.       Left eye blurry vision-possible CVA, neurology has been consulted, continue to monitor.    Type 2 diabetes-continue with insulin protocol and glucose monitoring    Hypertension-continue monitoring blood pressure    DVT prophylaxis-SCD          The patient desires to be as follows:    CODE STATUS:    Code Status (Patient has no pulse and is not breathing): CPR (Attempt to Resuscitate)  Medical Interventions (Patient has pulse or is breathing): Full Support      Admission Status:  I believe this patient meets inpatient status.    Expected Length of Stay: 1-2    PDMP and Medication Dispenses via Sidebar reviewed and consistent with patient reported medications.    I discussed the patient's findings and my recommendations with patient.      Signature:     This document has been electronically signed by Ion Woody MD on March 30, 2024 16:39 EDT   [facility] [place] [facility]     Electronically signed by Ion Woody  MD Danilo at 03/30/24 1644          Emergency Department Notes        Alireza Garcia MD at 03/30/24 1243        Procedure Orders    1. ECG 12 Lead [[id number]] ordered by Alireza Garcia MD    2. Critical Care [[id number]] ordered by Alireza Garcia MD                 Subjective   History of Present Illness  63-year-old female presenting to the emergency department with chief of pain in the left.  Pain associated with visual changes.  Pain started this morning.  Patient woke up with the pain.  Pain is lasted for approximately 2 to 3 hours.  Patient had past history of CVA with left thigh and left hand involvement.        Review of Systems   Constitutional: Negative.    Eyes:  Positive for photophobia, pain and visual disturbance.   Neurological: Negative.  Negative for weakness.   Hematological: Negative.    Psychiatric/Behavioral: Negative.     All other systems reviewed and are negative.      Past Medical History:   Diagnosis Date    Diabetes        No Known Allergies    History reviewed. No pertinent surgical history.    History reviewed. No pertinent family history.    Social History     Socioeconomic History    Marital status:    Tobacco Use    Smoking status: Every Day     Types: Cigarettes    Smokeless tobacco: Never   Vaping Use    Vaping status: Never Used   Substance and Sexual Activity    Alcohol use: Never    Drug use: Never    Sexual activity: Defer           Objective   Physical Exam  Vitals and nursing note reviewed.   Constitutional:       Appearance: She is obese.   HENT:      Head: Normocephalic and atraumatic.   Eyes:      General: Lids are normal. Lids are everted, no foreign bodies appreciated.         Right eye: No foreign body or discharge.         Left eye: No foreign body or discharge.      Extraocular Movements: Extraocular movements intact.      Conjunctiva/sclera: Conjunctivae normal.      Pupils:      Right eye: No corneal abrasion or fluorescein uptake.      Left eye:  Corneal abrasion and fluorescein uptake present.      Slit lamp exam:     Right eye: Anterior chamber quiet.      Left eye: Anterior chamber quiet.      Visual Fields:      Right eye: CF in the upper temporal quadrant. CF in the upper nasal quadrant. CF in the lower temporal quadrant. CF in the lower nasal quadrant.        Comments: Fluorosence uptake -left conrena   Cardiovascular:      Rate and Rhythm: Normal rate and regular rhythm.   Musculoskeletal:      Cervical back: Normal range of motion and neck supple.   Neurological:      General: No focal deficit present.      Mental Status: She is alert and oriented to person, place, and time.      GCS: GCS eye subscore is 4. GCS verbal subscore is 5. GCS motor subscore is 6.      Cranial Nerves: No cranial nerve deficit.      Sensory: Sensation is intact. No sensory deficit.      Motor: Motor function is intact. No weakness.      Coordination: Romberg sign negative. Finger-Nose-Finger Test normal. Rapid alternating movements normal.      Gait: Gait normal.      Deep Tendon Reflexes:      Reflex Scores:       Patellar reflexes are 1+ on the right side and 1+ on the left side.     Comments: Left eye - left visual field deficit.   Right eye- normal visual field , no deficit.   Psychiatric:         Mood and Affect: Mood normal.         Behavior: Behavior normal.         ECG 12 Lead      Date/Time: 3/30/2024 12:53 PM    Performed by: Alireza Garcia MD  Authorized by: Alireza Garcia MD  Interpreted by ED physician  Rhythm: sinus rhythm  BPM: 100  Conduction: non-specific intraventricular conduction delay  ST Segments: ST segments normal  Clinical impression: non-specific ECG    Critical Care    Performed by: Alireza Garcia MD  Authorized by: Alireza Garcia MD    Critical care provider statement:     Critical care time (minutes):  70    Critical care end time:  3/30/2024 12:55 PM    Critical care time was exclusive of:  Separately billable procedures and  treating other patients    Critical care was necessary to treat or prevent imminent or life-threatening deterioration of the following conditions:  CNS failure or compromise    Critical care was time spent personally by me on the following activities:  Ordering and performing treatments and interventions, ordering and review of laboratory studies, ordering and review of radiographic studies, pulse oximetry, re-evaluation of patient's condition, review of old charts, examination of patient, evaluation of patient's response to treatment, discussions with consultants and discussions with primary provider    I assumed direction of critical care for this patient from another provider in my specialty: yes      Care discussed with: admitting provider              ED Course  ED Course as of 03/30/24 1255   Sat Mar 30, 2024   1235 CT Angiogram Head w AI Analysis of LVO [RW]   1236 XR Chest 1 View [RW]   1236 ECG 12 Lead ED Triage Standing Order; Acute Stroke (Onset <24 hrs) [RW]      ED Course User Index  [RW] Alireza Garcia MD                          Total (NIH Stroke Scale): 1                Medical Decision Making  Problems Addressed:  Hemianopia of left eye: complicated acute illness or injury  Left corneal abrasion, initial encounter: complicated acute illness or injury    Amount and/or Complexity of Data Reviewed  Labs: ordered.  Radiology: ordered. Decision-making details documented in ED Course.  ECG/medicine tests: ordered. Decision-making details documented in ED Course.    Risk  Prescription drug management.  Decision regarding hospitalization.        Final diagnoses:   Hemianopia of left eye   Left corneal abrasion, initial encounter       ED Disposition  ED Disposition       ED Disposition   Decision to Admit    Condition   --    Comment   Level of Care: Telemetry [5]   Diagnosis: Hemianopsia [081051]   Admitting Physician: BRENDA AUGUSTE [[id number]]   Attending Physician: BRENDA AUGUSTE [[id number]]       "           No follow-up provider specified.       Medication List      No changes were made to your prescriptions during this visit.           Electronically signed by Alireza Garcia MD at 03/30/24 1255       Funmi Egan RN at 03/30/24 1218          Stroke neuro provider evaluated pt via Zoom call. Reported to pt that labwork and CT scans were all negative. Informed pt that he would still like to admit pt for further workup including possible eye inflammation and eye evaluation of nerves. Pt was informed she may need an MRI.    Electronically signed by Funmi Egan RN at 03/30/24 1335       Natali Llanos RN at 03/30/24 1136          Stroke Neurology called     Electronically signed by Natali Llanos RN at 03/30/24 1142       Natali Llanos RN at 03/30/24 1134          Dr. Guerra returned page for Neurology      Electronically signed by Natali Llanos RN at 03/30/24 1135       Natali Llanos RN at 03/30/24 1130          Neurology Paged     Electronically signed by Natali Llanos RN at 03/30/24 1130       Natali Llanos RN at 03/30/24 1100          Visual acuity   Right 20/20  Left 20/200  Both 20/15    Electronically signed by Natali Llanos RN at 03/30/24 1115       Natali Llanos RN at 03/30/24 1038          Pt reports left eye pain. Pt states \"It feels like someone is poking me with a needle in my eye\" Pt reports upon waking this morning around [id number] she noticed pain to her left eye. She reports as she was waking up she noticed her vision in her left eye was blurry. Her Last known normal was 03- at 2330.  Upon exam she reported left eye pain increases with movement.     Electronically signed by Natali Llanos RN at 03/30/24 1344       Operative/Procedure Notes (last 48 hours)  Notes from 03/30/24 1329 through 04/01/24 1329   No notes of this type exist for this encounter.       Physician Progress Notes (last 48 hours)  Notes from 03/30/24 1329 through " 24 1329   No notes of this type exist for this encounter.       Consult Notes (last 48 hours)  Notes from 24 1329 through 24 1329   No notes of this type exist for this encounter.          Discharge Summary        Alma Rosa Emanuel MD at 24 0918                    Discharge Summary    Date of Service: 3/31  Patient Name: Rita Hussein  : 1960  MRN: [id number]    Date of Admission: 3/30/2024  Discharge Diagnosis: Patient complaining of left eye hurting with some blurred vision  Her workup was negative for TIA or CVA with normal MRI  Patient was on aspirin and Plavix prior to admission  Seen by neurology and cleared for discharge vision is  Within normal limit day of discharge  Patient with history of diabetes hypertension  Seen in consult by neurology  Date of Discharge:  3/31  Primary Care Physician: Monserrat Reddy APRN      Presenting Problem:   Hemianopsia [H53.47]  Left corneal abrasion, initial encounter [S05.02XA]  Hemianopia of left eye [H53.462]    Active and Resolved Hospital Problems:  Active Hospital Problems    Diagnosis POA    **Hemianopsia [H53.47] Yes      Resolved Hospital Problems   No resolved problems to display.         Hospital Course     Hospital Course:  Rita Hussein is a 63 y.o. female who woke up on the day of admission on   With  left eye discomfort and felt some blurred vision  This has resolved  There was no slurred speech and no focal weakness  Her workup done in the hospital stay was negative including MRI of the brain  Seen by neurology and was cleared for discharge        DISCHARGE Follow Up Recommendations for labs and diagnostics: pcp in 3 d awake alert vented x 3        Reasons For Change In Medications and Indications for New Medications:      Day of Discharge     Vital Signs:  Temp:  [97.5 °F (36.4 °C)-98.5 °F (36.9 °C)] 97.5 °F (36.4 °C)  Heart Rate:  [] 99  Resp:  [10-19] 16  BP: (113-154)/(50-92) 130/92    Physical  Exam:  Physical Exam     alert vented x 3  HEENT exam is normal  Neck supple  Chest is adulteration  Heart S1 is 2 no murmur  Abdomen soft benign nontender bowel sounds positive  Neuroexam is nonfocal    Pertinent  and/or Most Recent Results     LAB RESULTS:      Lab 03/31/24  0217 03/30/24  1812 03/30/24  1152 03/30/24  1114 03/30/24  1047   WBC 8.06 6.70  --   --  7.68   HEMOGLOBIN 14.8 15.5  --   --  15.8   HEMATOCRIT 46.1 47.3*  --   --  49.1*   PLATELETS 238 280  --   --  249   NEUTROS ABS 4.35 4.63  --   --  4.56   IMMATURE GRANS (ABS) 0.05 0.04  --   --  0.04   LYMPHS ABS 2.79 1.41  --   --  2.42   MONOS ABS 0.66 0.45  --   --  0.51   EOS ABS 0.16 0.13  --   --  0.13   MCV 88.5 86.2  --   --  87.4   PROTIME  --   --  14.2  --   --    APTT  --   --   --  27.7  --          Lab 03/31/24  0217 03/30/24  1812 03/30/24  1047   SODIUM 137 135* 132*   POTASSIUM 4.8 4.4 4.1   CHLORIDE 99 97* 95*   CO2 28.0 24.0 23.7   ANION GAP 10.0 14.0 13.3   BUN 16 11 12   CREATININE 0.80 0.77 0.84   EGFR 82.9 86.8 78.2   GLUCOSE 195* 175* 262*   CALCIUM 9.9 9.8 9.7   MAGNESIUM 1.9 1.8  --    HEMOGLOBIN A1C  --  10.10*  --    TSH  --  1.020  --          Lab 03/30/24  1047   TOTAL PROTEIN 6.9   ALBUMIN 4.5   GLOBULIN 2.4   ALT (SGPT) 27   AST (SGOT) 17   BILIRUBIN 0.6   ALK PHOS 101         Lab 03/30/24  1152 03/30/24  1047   HSTROP T  --  8   PROTIME 14.2  --    INR 1.3*  --          Lab 03/30/24  1812   CHOLESTEROL 158   LDL CHOL 74   HDL CHOL 41   TRIGLYCERIDES 267*             Brief Urine Lab Results       None          Microbiology Results (last 10 days)       ** No results found for the last 240 hours. **            MRI Brain With & Without Contrast    Result Date: 3/30/2024  Impression: Impression: 1.No acute process identified 2.Confluent white matter changes most frequently seen in the setting of microvascular ischemic disease although nonspecific. Electronically Signed: Alberto Soliz MD  3/30/2024 5:51 PM EDT  Workstation  ID: [id number]    CT Angiogram Head w AI Analysis of LVO    Result Date: 3/30/2024  Impression: 1.No acute abnormality identified within the large arteries of the head or neck. 2.No significant stenosis of the bilateral internal carotid arteries. Electronically Signed: Jason Renteria MD  3/30/2024 12:20 PM EDT  Workstation ID: [id number]    CT Angiogram Neck    Result Date: 3/30/2024  Impression: 1.No acute abnormality identified within the large arteries of the head or neck. 2.No significant stenosis of the bilateral internal carotid arteries. Electronically Signed: Jason Renteria MD  3/30/2024 12:20 PM EDT  Workstation ID: [id number]    CT CEREBRAL PERFUSION WITH & WITHOUT CONTRAST    Result Date: 3/30/2024  Impression: 1. No CT perfusion evidence of acute cerebral ischemia or core infarct. Electronically Signed: tSeve Leon MD  3/30/2024 12:07 PM EDT  Workstation ID: [id number]    CT Head Without Contrast Stroke Protocol    Result Date: 3/30/2024  Impression: 1.No acute intracranial abnormality is identified. 2.Findings compatible with chronic microvascular ischemic change. Electronically Signed: Jason Renteria MD  3/30/2024 11:56 AM EDT  Workstation ID: [id number]    XR Chest 1 View    Result Date: 3/30/2024  Impression: Impression: 1.No acute radiographic abnormality is identified. Electronically Signed: Jason Renteria MD  3/30/2024 11:06 AM EDT  Workstation ID: [id number]                 Labs Pending at Discharge:  Pending Labs       Order Current Status    Vitamin B12 In process            Procedures Performed           Consults:   Consults       Date and Time Order Name Status Description    3/30/2024  4:45 PM Inpatient Ophthalmology Consult      3/30/2024  4:28 PM Inpatient Neurology Consult Stroke      3/30/2024 11:39 AM Inpatient Neurology Consult Stroke      3/30/2024 11:39 AM Inpatient Neurology Consult Stroke                Discharge Details        Discharge Medications        Continue These Medications         Instructions Start Date   Accu-Chek Softclix Lancets lancets   See Admin Instructions             ASK your doctor about these medications        Instructions Start Date   atorvastatin 40 MG tablet  Commonly known as: LIPITOR   TAKE ONE (1) TABLET BY MOUTH EVERY EVENING      clopidogrel 75 MG tablet  Commonly known as: PLAVIX   TAKE ONE (1) TABLET BY MOUTH EVERY DAY      hydrOXYzine 25 MG tablet  Commonly known as: ATARAX   25 mg, Oral, 2 Times Daily PRN      Jardiance 25 MG tablet tablet  Generic drug: empagliflozin   TAKE ONE (1) TABLET BY MOUTH EVERY DAY      lisinopril 10 MG tablet  Commonly known as: PRINIVIL,ZESTRIL   TAKE ONE (1) TABLET BY MOUTH EVERY DAY      loratadine 10 MG tablet  Commonly known as: CLARITIN   10 mg, Oral, Daily      metFORMIN  MG 24 hr tablet  Commonly known as: GLUCOPHAGE-XR   TAKE TWO (2) TABLETS BY MOUTH EVERY DAY WITH breakfast      sertraline 100 MG tablet  Commonly known as: Zoloft   100 mg, Oral, Nightly               No Known Allergies      Discharge Disposition: home      Diet:  Hospital:  Diet Order   Procedures    Diet: Cardiac, Diabetic; Healthy Heart (2-3 Na+); Consistent Carbohydrate; Fluid Consistency: Thin (IDDSI 0)         Discharge Activity:         CODE STATUS:  Code Status and Medical Interventions:   Ordered at: 03/30/24 1628     Code Status (Patient has no pulse and is not breathing):    CPR (Attempt to Resuscitate)     Medical Interventions (Patient has pulse or is breathing):    Full Support         Future Appointments   Date Time Provider Department Center   5/21/2024 11:15 AM Santa Echols, LISA, APRN [facility]           Time spent on Discharge including face to face service:  35 minutes        Signature: Electronically signed by Alma Rosa Emanuel MD, 03/31/24, 09:19 EDT.  [facility] Hospitalist Team      Electronically signed by Alma Rosa Emanuel MD at 03/31/24 0964

## 2024-06-06 ENCOUNTER — OFFICE VISIT (OUTPATIENT)
Dept: PSYCHIATRY | Facility: CLINIC | Age: 64
End: 2024-06-06
Payer: COMMERCIAL

## 2024-06-06 VITALS
WEIGHT: 202 LBS | HEART RATE: 105 BPM | SYSTOLIC BLOOD PRESSURE: 163 MMHG | BODY MASS INDEX: 33.61 KG/M2 | DIASTOLIC BLOOD PRESSURE: 89 MMHG | OXYGEN SATURATION: 95 %

## 2024-06-06 DIAGNOSIS — F41.1 GENERALIZED ANXIETY DISORDER: ICD-10-CM

## 2024-06-06 DIAGNOSIS — F33.1 MAJOR DEPRESSIVE DISORDER, RECURRENT EPISODE, MODERATE: ICD-10-CM

## 2024-06-06 PROCEDURE — 99214 OFFICE O/P EST MOD 30 MIN: CPT
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
## 2024-06-10 RX ORDER — SERTRALINE HYDROCHLORIDE 100 MG/1
100 TABLET, FILM COATED ORAL NIGHTLY
Qty: 90 TABLET | Refills: 1 | Status: SHIPPED | OUTPATIENT
Start: 2024-06-10 | End: 2025-06-10

## 2024-06-10 RX ORDER — HYDROXYZINE HYDROCHLORIDE 25 MG/1
25 TABLET, FILM COATED ORAL 2 TIMES DAILY PRN
Qty: 180 TABLET | Refills: 0 | Status: SHIPPED | OUTPATIENT
Start: 2024-06-10

## 2024-10-17 ENCOUNTER — PATIENT ROUNDING (BHMG ONLY) (OUTPATIENT)
Dept: ORTHOPEDIC SURGERY | Facility: CLINIC | Age: 64
End: 2024-10-17
Payer: COMMERCIAL

## 2024-10-17 ENCOUNTER — OFFICE VISIT (OUTPATIENT)
Dept: PODIATRY | Facility: CLINIC | Age: 64
End: 2024-10-17
Payer: COMMERCIAL

## 2024-10-17 VITALS — HEIGHT: 64 IN | RESPIRATION RATE: 20 BRPM | BODY MASS INDEX: 32.95 KG/M2 | WEIGHT: 193 LBS

## 2024-10-17 DIAGNOSIS — S82.62XA AVULSION FRACTURE OF LATERAL MALLEOLUS, LEFT, CLOSED, INITIAL ENCOUNTER: ICD-10-CM

## 2024-10-17 DIAGNOSIS — S93.402A MODERATE ANKLE SPRAIN, LEFT, INITIAL ENCOUNTER: ICD-10-CM

## 2024-10-17 DIAGNOSIS — M25.572 ACUTE LEFT ANKLE PAIN: Primary | ICD-10-CM

## 2024-10-18 NOTE — PROGRESS NOTES
10/17/2024  Foot and Ankle Surgery - New Patient   Provider: Dr. Hussein Ramirez DPM  Location: [facility]    Subjective:  Rita Hussein is a 64 y.o. female.     Chief Complaint   Patient presents with    Left Ankle - Pain, Initial Evaluation, Fracture    Initial Evaluation     CASI MARTIN APRN  6/5/2023       History of Present Illness  The patient presents for evaluation of her ankle.    She sustained an ankle injury last Thursday while walking in her yard, stepping into a small depression. She sought immediate medical attention at an urgent care facility where an x-ray was performed. She has no history of open wounds or infections on her feet. Her condition has improved significantly since the initial injury.    Supplemental Information  She is diabetic.    SOCIAL HISTORY  She smokes half a pack a day.       No Known Allergies    Past Medical History:   Diagnosis Date    Diabetes     Hypertension     Take meds    TIA (transient ischemic attack)        History reviewed. No pertinent surgical history.    Family History   Problem Relation Age of Onset    Cancer Mother         Breast cancer, lymphoma       Social History     Socioeconomic History    Marital status:    Tobacco Use    Smoking status: Some Days     Current packs/day: 0.25     Average packs/day: 0.3 packs/day for 49.5 years (12.4 ttl pk-yrs)     Types: Cigarettes     Start date: 4/8/1975     Passive exposure: Current    Smokeless tobacco: Never    Tobacco comments:     Have quit and restarted a few times   Vaping Use    Vaping status: Never Used   Substance and Sexual Activity    Alcohol use: Not Currently    Drug use: Never    Sexual activity: Not Currently     Partners: Male        Current Outpatient Medications on File Prior to Visit   Medication Sig Dispense Refill    Accu-Chek Softclix Lancets lancets See Admin Instructions.      atorvastatin (LIPITOR) 40 MG tablet TAKE ONE (1) TABLET BY MOUTH EVERY EVENING      clopidogrel  "(PLAVIX) 75 MG tablet TAKE ONE (1) TABLET BY MOUTH EVERY DAY      hydrOXYzine (ATARAX) 25 MG tablet Take 1 tablet by mouth 2 (Two) Times a Day As Needed for Anxiety. 180 tablet 0    Jardiance 25 MG tablet tablet TAKE ONE (1) TABLET BY MOUTH EVERY DAY      lisinopril (PRINIVIL,ZESTRIL) 10 MG tablet TAKE ONE (1) TABLET BY MOUTH EVERY DAY      loratadine (CLARITIN) 10 MG tablet Take 1 tablet by mouth Daily.      metFORMIN ER (GLUCOPHAGE-XR) 500 MG 24 hr tablet TAKE TWO (2) TABLETS BY MOUTH EVERY DAY WITH breakfast      sertraline (Zoloft) 100 MG tablet Take 1 tablet by mouth Every Night. 90 tablet 1     No current facility-administered medications on file prior to visit.         Objective   Resp 20   Ht 162.6 cm (64\")   Wt 87.5 kg (193 lb)   BMI 33.13 kg/m²     Foot/Ankle Exam    GENERAL  Appearance:  appears stated age  Orientation:  AAOx3  Affect:  appropriate    VASCULAR     Left Foot Vascularity   Normal vascular exam    Dorsalis pedis:  2+  Posterior tibial:  2+  Skin temperature:  warm  Edema grading:  None  CFT:  < 3 seconds  Pedal hair growth:  Present  Varicosities:  none     NEUROLOGIC     Left Foot Neurologic   Light touch sensation: normal  Hot/Cold sensation:  normal  Achilles reflex:  2+    MUSCULOSKELETAL     Left Foot Musculoskeletal   Ecchymosis:  none  Tenderness:  ankle joint tenderness  Arch:  Normal    DERMATOLOGIC        Left Foot Dermatologic   Skin  Left foot skin is intact.      Left foot additional comments: Range of motion, muscle strength, and instability testing deferred secondary to guarding.  No open wounds or signs of infection.  No proximal fibular pain.  No resting deformity.    Physical Exam         Results  Imaging  Ankle X-ray shows a small fracture, described as a small chip of bone.       Assessment & Plan   Diagnoses and all orders for this visit:    1. Acute left ankle pain (Primary)    2. Avulsion fracture of lateral malleolus, left, closed, initial encounter    3. Moderate " ankle sprain, left, initial encounter       Assessment & Plan    The ankle fracture is minor, characterized by a small chip of bone. It is primarily a soft tissue injury similar to a sprain. A compression garment will be provided to help with swelling. She is advised to wear the boot consistently, even at home, to reduce stress on the ankle and support it. Ankle exercises, including circular movements and alphabet drawing, are recommended to aid recovery. Symptom management will include rest, ice, compression, elevation, and anti-inflammatories. Warm water soaks can be used to alleviate morning tightness. She should avoid uneven terrain and increased activity to prevent further injury. A follow-up x-ray will be conducted in 3 weeks to assess progress.Reviewed proper basic stretching and manual therapy exercises along with appropriate shoes and activity.  Discussed proper use and/or avoidance of OTC anti-inflammatories.  Patient is to call with any additional issues or concerns.  Greater than 30 minutes was spent before, during, and after evaluation for patient care      Follow-up  Return in 3 weeks for follow up and repeat imaging.           Patient or patient representative verbalized consent for the use of Ambient Listening during the visit with  CYNTHIA Ramirez DPM for chart documentation. 10/18/2024  13:10 EDT    CYNTHIA Ramirez DPM

## 2024-11-07 ENCOUNTER — OFFICE VISIT (OUTPATIENT)
Dept: PODIATRY | Facility: CLINIC | Age: 64
End: 2024-11-07
Payer: COMMERCIAL

## 2024-11-07 VITALS — WEIGHT: 193 LBS | BODY MASS INDEX: 32.95 KG/M2 | RESPIRATION RATE: 20 BRPM | HEIGHT: 64 IN

## 2024-11-07 DIAGNOSIS — S82.62XD AVULSION FRACTURE OF LATERAL MALLEOLUS, LEFT, CLOSED, WITH ROUTINE HEALING, SUBSEQUENT ENCOUNTER: ICD-10-CM

## 2024-11-07 DIAGNOSIS — M25.572 ACUTE LEFT ANKLE PAIN: Primary | ICD-10-CM

## 2024-11-07 DIAGNOSIS — S93.402D MODERATE ANKLE SPRAIN, LEFT, SUBSEQUENT ENCOUNTER: ICD-10-CM

## 2024-11-08 NOTE — PROGRESS NOTES
"11/07/2024  Foot and Ankle Surgery - Established Patient/Follow-up  Provider: Dr. Hussein Ramirez DPM  Location: [facility]    Subjective:  Rita Hussein is a 64 y.o. female.     Chief Complaint   Patient presents with    Left Ankle - Follow-up, Pain, Fracture     Avulsion fracture of lateral malleolus    Follow-up     CASI MARTIN KEENAN  6/5/2023       History of Present Illness  The patient presents for evaluation of ankle pain.    She reports an improvement in her condition. She has been consistently using the prescribed boot for her ankle. She also mentions that she has a compression sleeve for her ankle, but it requires cleaning.      No Known Allergies    Current Outpatient Medications on File Prior to Visit   Medication Sig Dispense Refill    Accu-Chek Softclix Lancets lancets See Admin Instructions.      atorvastatin (LIPITOR) 40 MG tablet TAKE ONE (1) TABLET BY MOUTH EVERY EVENING      clopidogrel (PLAVIX) 75 MG tablet TAKE ONE (1) TABLET BY MOUTH EVERY DAY      hydrOXYzine (ATARAX) 25 MG tablet Take 1 tablet by mouth 2 (Two) Times a Day As Needed for Anxiety. 180 tablet 0    Jardiance 25 MG tablet tablet TAKE ONE (1) TABLET BY MOUTH EVERY DAY      lisinopril (PRINIVIL,ZESTRIL) 10 MG tablet TAKE ONE (1) TABLET BY MOUTH EVERY DAY      loratadine (CLARITIN) 10 MG tablet Take 1 tablet by mouth Daily.      metFORMIN ER (GLUCOPHAGE-XR) 500 MG 24 hr tablet TAKE TWO (2) TABLETS BY MOUTH EVERY DAY WITH breakfast      sertraline (Zoloft) 100 MG tablet Take 1 tablet by mouth Every Night. 90 tablet 1     No current facility-administered medications on file prior to visit.       Objective   Resp 20   Ht 162.6 cm (64\")   Wt 87.5 kg (193 lb)   BMI 33.13 kg/m²     Foot/Ankle Exam  Physical Exam  GENERAL  Appearance:  appears stated age  Orientation:  AAOx3  Affect:  appropriate     VASCULAR      Left Foot Vascularity   Normal vascular exam    Dorsalis pedis:  2+  Posterior tibial:  2+  Skin temperature:  " warm  Edema grading:  None  CFT:  < 3 seconds  Pedal hair growth:  Present  Varicosities:  none     NEUROLOGIC      Left Foot Neurologic   Light touch sensation: normal  Hot/Cold sensation:  normal  Achilles reflex:  2+     MUSCULOSKELETAL      Left Foot Musculoskeletal   Ecchymosis:  none  Tenderness:  ankle joint tenderness  Arch:  Normal     DERMATOLOGIC          Left Foot Dermatologic   Skin  Left foot skin is intact.      Left foot additional comments: Range of motion, muscle strength, and instability testing deferred secondary to guarding.  No open wounds or signs of infection.  No proximal fibular pain.  No resting deformity.    11/7/24: Continued discomfort involving the left ankle.  Swelling has improved modestly.  Continued discomfort with palpation.  Range of motion has improved mildly.  No progressive deformity.      Results  Imaging  X-ray of the ankle shows some consolidation.    Assessment & Plan   Diagnoses and all orders for this visit:    1. Acute left ankle pain (Primary)    2. Avulsion fracture of lateral malleolus, left, closed, with routine healing, subsequent encounter  -     XR Ankle 3+ View Left    3. Moderate ankle sprain, left, subsequent encounter      Assessment & Plan    The x-ray results indicate a slight improvement in her condition, with some consolidation observed. She is advised to continue her range of motion exercises. A lace-up ankle brace will be provided today, which she should start using instead of the boot. She is also encouraged to wear tennis shoes for added stability. Additional stretching exercises will be given for her to incorporate into her routine. Should she encounter any difficulties, physical therapy will be considered as an alternative treatment option.Reviewed proper basic stretching and manual therapy exercises along with appropriate shoes and activity.  Discussed proper use and/or avoidance of OTC anti-inflammatories.  Patient is to call with any additional  issues or concerns.  Greater than 20 minutes was spent before, during, and after evaluation for patient care.    Follow-up  Return in 4 weeks for follow up.             Patient or patient representative verbalized consent for the use of Ambient Listening during the visit with  CYNTHIA Ramirez DPM for chart documentation. 11/8/2024  11:04 EST    CYNTHIA Ramirez DPM

## 2024-12-02 ENCOUNTER — OFFICE VISIT (OUTPATIENT)
Dept: PSYCHIATRY | Facility: CLINIC | Age: 64
End: 2024-12-02
Payer: COMMERCIAL

## 2024-12-02 DIAGNOSIS — F33.1 MAJOR DEPRESSIVE DISORDER, RECURRENT EPISODE, MODERATE: Primary | ICD-10-CM

## 2024-12-02 DIAGNOSIS — F41.1 GENERALIZED ANXIETY DISORDER: ICD-10-CM

## 2024-12-02 PROCEDURE — 96127 BRIEF EMOTIONAL/BEHAV ASSMT: CPT

## 2024-12-02 PROCEDURE — 99214 OFFICE O/P EST MOD 30 MIN: CPT

## 2024-12-02 RX ORDER — HYDROXYZINE HYDROCHLORIDE 25 MG/1
25 TABLET, FILM COATED ORAL 2 TIMES DAILY PRN
Qty: 180 TABLET | Refills: 1 | Status: SHIPPED | OUTPATIENT
Start: 2024-12-02

## 2024-12-02 RX ORDER — SERTRALINE HYDROCHLORIDE 100 MG/1
150 TABLET, FILM COATED ORAL NIGHTLY
Qty: 135 TABLET | Refills: 1 | Status: SHIPPED | OUTPATIENT
Start: 2024-12-02
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
